# Patient Record
Sex: FEMALE | Race: WHITE | Employment: UNEMPLOYED | URBAN - METROPOLITAN AREA
[De-identification: names, ages, dates, MRNs, and addresses within clinical notes are randomized per-mention and may not be internally consistent; named-entity substitution may affect disease eponyms.]

---

## 2018-11-13 ENCOUNTER — HOSPITAL ENCOUNTER (OUTPATIENT)
Dept: GENERAL RADIOLOGY | Age: 43
Discharge: HOME | End: 2018-11-15
Payer: COMMERCIAL

## 2018-11-13 DIAGNOSIS — Z12.31 VISIT FOR SCREENING MAMMOGRAM: ICD-10-CM

## 2018-11-13 DIAGNOSIS — R92.2 BREAST DENSITY: ICD-10-CM

## 2018-11-13 PROCEDURE — 77063 BREAST TOMOSYNTHESIS BI: CPT

## 2018-11-13 PROCEDURE — 76641 ULTRASOUND BREAST COMPLETE: CPT

## 2019-04-15 ENCOUNTER — HOSPITAL ENCOUNTER (OUTPATIENT)
Dept: CT IMAGING | Age: 44
Discharge: HOME | End: 2019-04-17
Payer: COMMERCIAL

## 2019-04-15 DIAGNOSIS — S92.215D CLOSED NONDISPLACED FRACTURE OF CUBOID OF LEFT FOOT WITH ROUTINE HEALING, SUBSEQUENT ENCOUNTER: ICD-10-CM

## 2019-04-15 PROCEDURE — 73700 CT LOWER EXTREMITY W/O DYE: CPT

## 2019-07-26 ENCOUNTER — HOSPITAL ENCOUNTER (OUTPATIENT)
Dept: GENERAL RADIOLOGY | Age: 44
Discharge: HOME | End: 2019-07-28
Payer: COMMERCIAL

## 2019-07-26 DIAGNOSIS — N63.0 BREAST LUMP: ICD-10-CM

## 2019-07-26 DIAGNOSIS — N63.10 LUMP OF BREAST, RIGHT: ICD-10-CM

## 2019-07-26 PROCEDURE — 19083 BX BREAST 1ST LESION US IMAG: CPT

## 2019-07-26 PROCEDURE — G0279 TOMOSYNTHESIS, MAMMO: HCPCS

## 2019-07-26 PROCEDURE — 76642 ULTRASOUND BREAST LIMITED: CPT

## 2019-08-07 ENCOUNTER — TELEPHONE (OUTPATIENT)
Dept: BREAST CENTER | Age: 44
End: 2019-08-07

## 2019-08-19 ASSESSMENT — ENCOUNTER SYMPTOMS
CONSTIPATION: 0
BACK PAIN: 0
SORE THROAT: 0
ABDOMINAL PAIN: 0
ABDOMINAL DISTENTION: 0
RHINORRHEA: 0
EYE DISCHARGE: 0
NAUSEA: 0
SINUS PRESSURE: 0
SINUS PAIN: 0
EYE ITCHING: 0
VOICE CHANGE: 0
SHORTNESS OF BREATH: 0
WHEEZING: 0
CHOKING: 0
CHEST TIGHTNESS: 0
BLOOD IN STOOL: 0
DIARRHEA: 0
COUGH: 0
VOMITING: 0
TROUBLE SWALLOWING: 0

## 2019-08-19 NOTE — PROGRESS NOTES
Allergies   Allergen Reactions    Flagyl [Metronidazole]      Rapid heart rate    Seldane [Terfenadine]     Tetracyclines & Related     Terazol [Terconazole] Rash       Family History   Problem Relation Age of Onset    Thyroid Disease Mother         hypothyroidism    High Cholesterol Mother     High Blood Pressure Father     Cancer Maternal Aunt 44        Breast cancer       Social History     Socioeconomic History    Marital status:      Spouse name: Not on file    Number of children: Not on file    Years of education: Not on file    Highest education level: Not on file   Occupational History    Not on file   Social Needs    Financial resource strain: Not on file    Food insecurity:     Worry: Not on file     Inability: Not on file    Transportation needs:     Medical: Not on file     Non-medical: Not on file   Tobacco Use    Smoking status: Former Smoker     Last attempt to quit: 10/1/2004     Years since quittin.8    Smokeless tobacco: Never Used    Tobacco comment: 1/2 pack per day x 10 years   Substance and Sexual Activity    Alcohol use: Yes     Comment: 1 red wine monthly    Drug use: No    Sexual activity: Not on file   Lifestyle    Physical activity:     Days per week: Not on file     Minutes per session: Not on file    Stress: Not on file   Relationships    Social connections:     Talks on phone: Not on file     Gets together: Not on file     Attends Islam service: Not on file     Active member of club or organization: Not on file     Attends meetings of clubs or organizations: Not on file     Relationship status: Not on file    Intimate partner violence:     Fear of current or ex partner: Not on file     Emotionally abused: Not on file     Physically abused: Not on file     Forced sexual activity: Not on file   Other Topics Concern    Not on file   Social History Narrative    Not on file       OCCUPATION:  Not currently working. .     Review of Systems   Constitutional: Negative for activity change, appetite change, chills, fatigue, fever and unexpected weight change. She generally feels well. HENT: Negative for congestion, postnasal drip, rhinorrhea, sinus pressure, sinus pain, sore throat, trouble swallowing and voice change. Eyes: Negative for discharge, itching and visual disturbance. Respiratory: Negative for cough, choking, chest tightness, shortness of breath and wheezing. Cardiovascular: Negative for chest pain, palpitations and leg swelling. Gastrointestinal: Negative for abdominal distention, abdominal pain, blood in stool, constipation, diarrhea, nausea and vomiting. Endocrine: Negative for cold intolerance and heat intolerance. Genitourinary: Negative for difficulty urinating, dysuria, frequency and hematuria. Musculoskeletal: Negative for arthralgias, back pain, gait problem, joint swelling, myalgias, neck pain and neck stiffness. Allergic/Immunologic: Negative for environmental allergies and food allergies. Neurological: Negative for dizziness, seizures, syncope, speech difficulty, weakness, light-headedness and headaches. Hematological: Negative for adenopathy. Does not bruise/bleed easily. Psychiatric/Behavioral: Negative for agitation, confusion and decreased concentration. The patient is not nervous/anxious. Objective:   Physical Exam   Constitutional: She is oriented to person, place, and time. She appears well-developed and well-nourished. No distress. Pleasant and cooperative. Accompanied by her mother. HENT:   Head: Normocephalic and atraumatic. Mouth/Throat: Oropharynx is clear and moist. No oropharyngeal exudate. Eyes: Conjunctivae and EOM are normal. Right eye exhibits no discharge. Left eye exhibits no discharge. No scleral icterus. Neck: Normal range of motion. Neck supple. No JVD present. No tracheal deviation present. No thyromegaly present.    Cardiovascular: Normal rate and regular rhythm. Exam reveals no gallop and no friction rub. Murmur (Faint) heard. Pulmonary/Chest: Effort normal and breath sounds normal. No stridor. No respiratory distress. She has no wheezes. She has no rales. She exhibits no mass, no tenderness, no bony tenderness, no laceration, no edema, no deformity, no swelling and no retraction. Right breast exhibits no inverted nipple, no mass, no nipple discharge, no skin change and no tenderness. Left breast exhibits no inverted nipple, no mass, no nipple discharge, no skin change and no tenderness. Breasts are symmetrical.   Breasts are with age appropriate density bilaterally. No skin dimpling or puckering. No nipple discharge. No clinically suspicious lumps nodules or masses appreciated. No axillary lymphadenopathy. Abdominal: Soft. She exhibits no distension. There is no tenderness. There is no rebound and no guarding. Musculoskeletal: Normal range of motion. She exhibits no edema, tenderness or deformity. Right shoulder: Normal.        Left shoulder: Normal.   Lymphadenopathy:     She has no cervical adenopathy. Right cervical: No superficial cervical, no deep cervical and no posterior cervical adenopathy present. Left cervical: No superficial cervical, no deep cervical and no posterior cervical adenopathy present. Right axillary: No pectoral and no lateral adenopathy present. Left axillary: No pectoral and no lateral adenopathy present. Neurological: She is alert and oriented to person, place, and time. Coordination normal.   Skin: Skin is warm and dry. No rash noted. She is not diaphoretic. No erythema. No pallor. Psychiatric: She has a normal mood and affect. Her behavior is normal. Judgment and thought content normal.   Nursing note and vitals reviewed.       Assessment:     41 y.o. extremely pleasant female who's risk for breast cancer include gender,  Mother and Maternal Aunt (maternal aunt  age chemoprevention with Tamoxifen, which she is adamantly opposed to. She would like to think about the MRI and she will call us if she decides to proceed. Otherwise, will repeat screening mammogram in November +/- Right breast US (versus repeating in 6 months - January). Plan:   1. Continue monthly breast self examination; detailed instructions reviewed today. Bring any changes to your physician's attention. 2. Continue healthy diet and exercise routinely as tolerated to maintain IBW. 3. Avoid alcohol. 4. Limit caffeine intake. 5. Repeat mammogram November 2019.  6. Repeat right breast US in January 2020. 7. MRI bilateral breast to be done now (she will call us if she decides to proceed). 8. Continue follow up with Primary Care and Gynecology. 9. RTC November after mammogram with visit with Dr. Puente Record same day. During today's visit, face-to-face time 45 minutes, greater than 50% in counseling education and coordination of care. All questions were answered to her apparent satisfaction, and she is agreeable to the plan as outlined above. Jim Duenas RN, MSN, APRN-CNP, [address]  Advanced Oncology Certified Nurse Practitioner  Department of Breast Surgery  [facility] [place]/  Care in collaboration with Dr. Hiren Mistry.  Bogdan/Gloria Cristina 61, APRN - CNP

## 2019-08-26 ENCOUNTER — OFFICE VISIT (OUTPATIENT)
Dept: BREAST CENTER | Age: 44
End: 2019-08-26
Payer: COMMERCIAL

## 2019-08-26 VITALS
TEMPERATURE: 97.8 F | HEART RATE: 62 BPM | WEIGHT: 123.5 LBS | HEIGHT: 64 IN | SYSTOLIC BLOOD PRESSURE: 112 MMHG | OXYGEN SATURATION: 99 % | BODY MASS INDEX: 21.08 KG/M2 | DIASTOLIC BLOOD PRESSURE: 62 MMHG | RESPIRATION RATE: 16 BRPM

## 2019-08-26 DIAGNOSIS — Z12.39 BREAST CANCER SCREENING, HIGH RISK PATIENT: Primary | ICD-10-CM

## 2019-08-26 DIAGNOSIS — Z91.89 AT HIGH RISK FOR BREAST CANCER: ICD-10-CM

## 2019-08-26 DIAGNOSIS — R92.2 DENSE BREAST TISSUE ON MAMMOGRAM: ICD-10-CM

## 2019-08-26 DIAGNOSIS — Z80.3 FAMILY HISTORY OF BREAST CANCER IN FIRST DEGREE RELATIVE: ICD-10-CM

## 2019-08-26 DIAGNOSIS — R92.2 DENSE BREAST TISSUE: ICD-10-CM

## 2019-08-26 DIAGNOSIS — N63.10 BREAST MASS, RIGHT: ICD-10-CM

## 2019-08-26 PROCEDURE — 99203 OFFICE O/P NEW LOW 30 MIN: CPT | Performed by: NURSE PRACTITIONER

## 2019-08-26 PROCEDURE — 99204 OFFICE O/P NEW MOD 45 MIN: CPT | Performed by: NURSE PRACTITIONER

## 2019-08-26 NOTE — COMMUNICATION BODY
Assessment:  41 y.o. extremely pleasant female who's risk for breast cancer include gender,  Mother and Maternal Aunt (maternal aunt  age 39 due to breast cancer). Her mother had Genetic testing which was negative. IMAGING:  -Bilateral screening mammogram on 2018 @ [facility]:  Negative (BI-RADS 1); Type 3 density.  -Bilateral breast US 2018 @ [address] [place] [address] Dr Jaime:   Finding 1:   Sonography was performed in both breasts using a radial and anti-radial approach. There are areas of duct ectasia seen in the retro-areolar region of both breasts.       No sonographic evidence of suspicious solid or cystic mass lesions.  No focal areas of suspicious atypical echogenicity.       The examination included imaging and evaluation of all four quadrants, the retroareolar region, and axilla of both breasts.       IMPRESSION:   Areas of duct ectasia in both breasts are benign.       Screening mammogram in 1 year is recommended.       =======================================   BI-RADS Category 2:  Benign     A diagnostic right mammogram (for palpable lump) and right breast US on 2019 @ [address] [place] [address] Dr Jaime:  TISSUE DENSITY:   The breast is extremely dense (Type 4 density).       MAMMOGRAM FINDINGS:   Finding 1:   The finding in question is not seen on mammogram.       ULTRASOUND FINDINGS:           Finding 1:   Sonography was performed in the right breast using a radial and anti-radial approach. There is a new oval solid mass with partially defined margins measuring 8 x 4 x 10 mm seen in the right breast at 6 o'clock located 7 centimeters from the nipple. Internal echogenicity is mixed demonstrating both hyperechoic and hypoechoic areas.       IMPRESSION:   New solid mass in the right breast is suspicious.    An ultrasound guided biopsy is recommended.       =======================================   BI-RADS Category 4:  Suspicious Abnormality   =======================================     2019 Addendum:     ULTRASOUND

## 2019-08-26 NOTE — LETTER
[facility]  [address] [address]  Phone: [phone]  Fax: [phone]        August 26, 2019       Patient: Laila Rinaldi   MR Number: [id number]   YOB: 1975   Date of Visit: 8/26/2019       Dear Dr. Katie Solis: Thank you for the request for consultation for Dorie Webber to the office of Dr. Felicia Marshall and Nurse Practitioner Rafael Vasquez for the evaluation of her high risk for breast cancer and a right breast mass. Below are the relevant portions of my assessment and plan of care. If you have questions, please do not hesitate to call me. I look forward to following Pedro Francois along with you.     Sincerely,        ERIC Roach - CNP    CC providers: Lili Marte, DO

## 2019-09-16 ENCOUNTER — OFFICE VISIT (OUTPATIENT)
Dept: ORTHOPEDIC SURGERY | Age: 44
End: 2019-09-16
Payer: COMMERCIAL

## 2019-09-16 VITALS
HEIGHT: 64 IN | DIASTOLIC BLOOD PRESSURE: 60 MMHG | SYSTOLIC BLOOD PRESSURE: 124 MMHG | HEART RATE: 71 BPM | BODY MASS INDEX: 20.49 KG/M2 | WEIGHT: 120 LBS

## 2019-09-16 DIAGNOSIS — M25.561 ACUTE PAIN OF RIGHT KNEE: Primary | ICD-10-CM

## 2019-09-16 PROCEDURE — 99203 OFFICE O/P NEW LOW 30 MIN: CPT | Performed by: ORTHOPAEDIC SURGERY

## 2019-09-17 NOTE — PROGRESS NOTES
mouth.  , Disp: , Rfl:     ALLERGIES  Allergies   Allergen Reactions    Flagyl [Metronidazole]      Rapid heart rate    Tetracyclines & Related Diarrhea    Seldane [Terfenadine] Anxiety     \"Hallucinations\"    Terazol [Terconazole] Rash       Controlled Substances Monitoring:          REVIEW OF SYSTEMS:     Constitutional:  Negative for weight loss, fevers, chills, fatigue  Cardiovascular: Negative for chest pain, palpitations  Pulmonary: Negative for shortness of breath, labored breathing, cough  GI: negative for abdominal pain, nausea, vomitting   MSK: per HPI  Skin: negative for rash, open wounds    All other systems reviewed and are negative         PHYSICAL EXAM     Vitals:    09/16/19 1503   BP: 124/60   Pulse: 71   Weight: 120 lb (54.4 kg)   Height: 5' 4\" (1.626 m)       Height: 5' 4\" (1.626 m)  Weight: [unfilled]  BMI:  Body mass index is 20.6 kg/m². General: The patient is alert and oriented x 3, appears to be stated age and in no distress. HEENT: head is normocephalic, atraumatic. EOMI. Neck: supple, trachea midline, no thyromegaly   Cardiovascular: peripheral pulses palpable. Normal Capillary refill   Respiratory: breathing unlabored, chest expansion symmetric   Skin: no rash, no open wounds, no erythema  Psych: normal affect; mood stable  Neurologic: gait normal, sensation grossly intact in extremities  MSK:        Lower Extremity:   Ipsilateral hip exam shows normal range of motion without pain with impingement testing. On exam the right knee, there is no effusion. The patella tracks midline. Patellar translation is normal.  Range of motion is full. Lockman and posterior drawer are stable. Knee is stable to varus and valgus stress throughout range of motion. Kavita's is negative. There is no tenderness posterior along the hamstring           IMAGING:    XR: 4 views of the right knee including weightbearing views show no acute abnormality.   Joint spaces are

## 2019-10-18 ENCOUNTER — TELEPHONE (OUTPATIENT)
Dept: BREAST CENTER | Age: 44
End: 2019-10-18

## 2019-10-19 ENCOUNTER — OFFICE VISIT (OUTPATIENT)
Dept: FAMILY MEDICINE CLINIC | Age: 44
End: 2019-10-19
Payer: COMMERCIAL

## 2019-10-19 VITALS — TEMPERATURE: 98.6 F | OXYGEN SATURATION: 98 % | HEART RATE: 70 BPM | WEIGHT: 120 LBS | BODY MASS INDEX: 20.6 KG/M2

## 2019-10-19 DIAGNOSIS — J02.0 ACUTE STREPTOCOCCAL PHARYNGITIS: Primary | ICD-10-CM

## 2019-10-19 PROCEDURE — 99213 OFFICE O/P EST LOW 20 MIN: CPT | Performed by: FAMILY MEDICINE

## 2019-10-19 RX ORDER — METHYLPREDNISOLONE 4 MG/1
TABLET ORAL
Qty: 1 KIT | Refills: 0 | Status: SHIPPED
Start: 2019-10-19 | End: 2020-03-10

## 2019-10-19 RX ORDER — LIDOCAINE HYDROCHLORIDE 20 MG/ML
10 SOLUTION OROPHARYNGEAL
Qty: 100 ML | Refills: 0 | Status: SHIPPED
Start: 2019-10-19 | End: 2020-03-10

## 2019-10-19 RX ORDER — AMOXICILLIN 875 MG/1
875 TABLET, COATED ORAL 2 TIMES DAILY
Qty: 14 TABLET | Refills: 0 | Status: SHIPPED | OUTPATIENT
Start: 2019-10-19 | End: 2019-10-26

## 2019-10-19 ASSESSMENT — ENCOUNTER SYMPTOMS
SWOLLEN GLANDS: 1
TROUBLE SWALLOWING: 0
GASTROINTESTINAL NEGATIVE: 1
EYES NEGATIVE: 1
RESPIRATORY NEGATIVE: 1
SORE THROAT: 1

## 2019-11-06 DIAGNOSIS — N63.10 BREAST MASS, RIGHT: Primary | ICD-10-CM

## 2019-11-06 DIAGNOSIS — Z91.89 AT HIGH RISK FOR BREAST CANCER: ICD-10-CM

## 2019-11-15 ASSESSMENT — ENCOUNTER SYMPTOMS
SINUS PAIN: 0
NAUSEA: 0
COUGH: 0
RHINORRHEA: 0
TROUBLE SWALLOWING: 0
SHORTNESS OF BREATH: 0
VOMITING: 0
SORE THROAT: 0
EYE DISCHARGE: 0
CHOKING: 0
CONSTIPATION: 0
BACK PAIN: 0
CHEST TIGHTNESS: 0
VOICE CHANGE: 0
ABDOMINAL PAIN: 0
WHEEZING: 0
BLOOD IN STOOL: 0
ABDOMINAL DISTENTION: 0
EYE ITCHING: 0
DIARRHEA: 0
SINUS PRESSURE: 0

## 2019-11-20 ENCOUNTER — TELEPHONE (OUTPATIENT)
Dept: BREAST CENTER | Age: 44
End: 2019-11-20

## 2019-11-22 ENCOUNTER — HOSPITAL ENCOUNTER (OUTPATIENT)
Dept: GENERAL RADIOLOGY | Age: 44
Discharge: HOME | End: 2019-11-24
Payer: COMMERCIAL

## 2019-11-22 ENCOUNTER — TELEPHONE (OUTPATIENT)
Dept: BREAST CENTER | Age: 44
End: 2019-11-22

## 2019-11-22 DIAGNOSIS — Z80.3 FAMILY HISTORY OF BREAST CANCER IN FIRST DEGREE RELATIVE: ICD-10-CM

## 2019-11-22 DIAGNOSIS — Z91.89 AT HIGH RISK FOR BREAST CANCER: ICD-10-CM

## 2019-11-22 DIAGNOSIS — R92.2 DENSE BREAST TISSUE ON MAMMOGRAM: ICD-10-CM

## 2019-11-22 DIAGNOSIS — Z12.39 BREAST CANCER SCREENING, HIGH RISK PATIENT: ICD-10-CM

## 2019-11-22 DIAGNOSIS — R92.2 DENSE BREAST TISSUE: ICD-10-CM

## 2019-11-22 DIAGNOSIS — N63.10 BREAST MASS, RIGHT: ICD-10-CM

## 2019-11-22 PROCEDURE — G0279 TOMOSYNTHESIS, MAMMO: HCPCS

## 2019-11-22 PROCEDURE — 76642 ULTRASOUND BREAST LIMITED: CPT

## 2019-12-09 ENCOUNTER — OFFICE VISIT (OUTPATIENT)
Dept: BREAST CENTER | Age: 44
End: 2019-12-09
Payer: COMMERCIAL

## 2019-12-09 VITALS
OXYGEN SATURATION: 98 % | TEMPERATURE: 98.6 F | DIASTOLIC BLOOD PRESSURE: 62 MMHG | SYSTOLIC BLOOD PRESSURE: 98 MMHG | BODY MASS INDEX: 20.83 KG/M2 | RESPIRATION RATE: 16 BRPM | HEIGHT: 64 IN | WEIGHT: 122 LBS | HEART RATE: 58 BPM

## 2019-12-09 DIAGNOSIS — N63.0 BREAST NODULE: ICD-10-CM

## 2019-12-09 DIAGNOSIS — Z91.89 AT HIGH RISK FOR BREAST CANCER: Primary | ICD-10-CM

## 2019-12-09 DIAGNOSIS — Z91.89 AT HIGH RISK FOR BREAST CANCER: ICD-10-CM

## 2019-12-09 DIAGNOSIS — N63.10 BREAST MASS, RIGHT: Primary | ICD-10-CM

## 2019-12-09 PROCEDURE — 99213 OFFICE O/P EST LOW 20 MIN: CPT | Performed by: SURGERY

## 2019-12-09 PROCEDURE — 99214 OFFICE O/P EST MOD 30 MIN: CPT | Performed by: SURGERY

## 2020-01-02 ENCOUNTER — TELEPHONE (OUTPATIENT)
Dept: BREAST CENTER | Age: 45
End: 2020-01-02

## 2020-01-07 ENCOUNTER — TELEPHONE (OUTPATIENT)
Dept: BREAST CENTER | Age: 45
End: 2020-01-07

## 2020-01-07 NOTE — TELEPHONE ENCOUNTER
Left VM re:  MBI was denied by insurance. If she remains uninterested in MRI bilateral breasts, will repeat US (complete) 6 months from date of her last visit. Doug Elena, RN, MSN, APRN-CNP, [address]  Advanced Oncology Certified Nurse Practitioner  Department of Breast Surgery  [facility]/  [facility] in collaboration with Dr. Yousif Mishra.  Bogdan/Dr. Marlo Fleischer

## 2020-01-09 ENCOUNTER — TELEPHONE (OUTPATIENT)
Dept: BREAST CENTER | Age: 45
End: 2020-01-09

## 2020-03-10 ENCOUNTER — OFFICE VISIT (OUTPATIENT)
Dept: PRIMARY CARE CLINIC | Age: 45
End: 2020-03-10
Payer: COMMERCIAL

## 2020-03-10 VITALS
DIASTOLIC BLOOD PRESSURE: 80 MMHG | RESPIRATION RATE: 16 BRPM | SYSTOLIC BLOOD PRESSURE: 102 MMHG | BODY MASS INDEX: 20.66 KG/M2 | TEMPERATURE: 98.6 F | HEART RATE: 64 BPM | WEIGHT: 121 LBS | OXYGEN SATURATION: 98 % | HEIGHT: 64 IN

## 2020-03-10 PROCEDURE — 99213 OFFICE O/P EST LOW 20 MIN: CPT | Performed by: FAMILY MEDICINE

## 2020-03-10 RX ORDER — FLUCONAZOLE 150 MG/1
150 TABLET ORAL ONCE
Qty: 1 TABLET | Refills: 0 | Status: SHIPPED | OUTPATIENT
Start: 2020-03-10 | End: 2020-03-10

## 2020-03-10 RX ORDER — AMOXICILLIN 875 MG/1
875 TABLET, COATED ORAL 2 TIMES DAILY
Qty: 20 TABLET | Refills: 0 | Status: SHIPPED | OUTPATIENT
Start: 2020-03-10 | End: 2020-03-20

## 2020-03-10 ASSESSMENT — ENCOUNTER SYMPTOMS
COUGH: 1
FACIAL SWELLING: 0
TROUBLE SWALLOWING: 0
SINUS PRESSURE: 1
EYES NEGATIVE: 1

## 2020-03-10 ASSESSMENT — PATIENT HEALTH QUESTIONNAIRE - PHQ9
SUM OF ALL RESPONSES TO PHQ QUESTIONS 1-9: 0
2. FEELING DOWN, DEPRESSED OR HOPELESS: 0
SUM OF ALL RESPONSES TO PHQ9 QUESTIONS 1 & 2: 0
1. LITTLE INTEREST OR PLEASURE IN DOING THINGS: 0
SUM OF ALL RESPONSES TO PHQ QUESTIONS 1-9: 0

## 2020-03-10 NOTE — PROGRESS NOTES
Chief Complaint:   Chief Complaint   Patient presents with    Otalgia     left    URI     sore throat, sinus        Sinusitis   This is a new problem. The current episode started in the past 7 days. The problem has been gradually worsening since onset. There has been no fever. Associated symptoms include congestion, coughing and sinus pressure. Patient Active Problem List   Diagnosis    Chronic headaches    Migraine       Past Medical History:   Diagnosis Date    Fibrocystic breast     Dr. Bryce PIERCE([phone])     saw Dr. Augie De Souza and several ENTs--now sees chiropractor and accupuncturist    Kidney stone     x2    Murmur        Past Surgical History:   Procedure Laterality Date    BREAST SURGERY  12/2011    Benign--needle bx       Current Outpatient Medications   Medication Sig Dispense Refill    fluconazole (DIFLUCAN) 150 MG tablet Take 1 tablet by mouth once for 1 dose 1 tablet 0    amoxicillin (AMOXIL) 875 MG tablet Take 1 tablet by mouth 2 times daily for 10 days 20 tablet 0     No current facility-administered medications for this visit. Allergies   Allergen Reactions    Flagyl [Metronidazole]      Rapid heart rate    Tetracyclines & Related Diarrhea    Seldane [Terfenadine] Anxiety     \"Hallucinations\"    Terazol [Terconazole] Rash       Social History     Socioeconomic History    Marital status:      Spouse name: None    Number of children: None    Years of education: None    Highest education level: None   Occupational History    None   Social Needs    Financial resource strain: None    Food insecurity     Worry: None     Inability: None    Transportation needs     Medical: None     Non-medical: None   Tobacco Use    Smoking status: Former Smoker     Last attempt to quit: 10/1/2004     Years since quitting: 15.4    Smokeless tobacco: Never Used    Tobacco comment: 1/2 pack per day x 10 years   Substance and Sexual Activity    Alcohol use:  Yes stridor. No wheezing or rales. Chest:      Chest wall: No tenderness. Abdominal:      General: Bowel sounds are normal. There is no distension. Palpations: Abdomen is soft. There is no mass. Tenderness: There is no abdominal tenderness. There is no guarding or rebound. Musculoskeletal: Normal range of motion. Skin:     General: Skin is warm and dry. Coloration: Skin is not pale. Findings: No erythema or rash. Neurological:      Mental Status: She is alert and oriented to person, place, and time. Deep Tendon Reflexes: Reflexes are normal and symmetric. Psychiatric:         Judgment: Judgment normal.                               ASSESSMENT/PLAN:    Hugh Paniagua was seen today for otalgia and uri. Diagnoses and all orders for this visit:    Acute bacterial sinusitis  -     fluconazole (DIFLUCAN) 150 MG tablet; Take 1 tablet by mouth once for 1 dose  -     amoxicillin (AMOXIL) 875 MG tablet;  Take 1 tablet by mouth 2 times daily for 10 days            Nery Graves DO    3/10/2020  4:43 PM

## 2020-04-24 ASSESSMENT — ENCOUNTER SYMPTOMS
CHEST TIGHTNESS: 0
EYE ITCHING: 0
TROUBLE SWALLOWING: 0
BACK PAIN: 0
VOICE CHANGE: 0
SINUS PAIN: 0
SINUS PRESSURE: 0
SORE THROAT: 0
BLOOD IN STOOL: 0
CONSTIPATION: 0
DIARRHEA: 0
ABDOMINAL DISTENTION: 0
WHEEZING: 0
COUGH: 0
SHORTNESS OF BREATH: 0
RHINORRHEA: 0
ABDOMINAL PAIN: 0
CHOKING: 0
NAUSEA: 0
VOMITING: 0
EYE DISCHARGE: 0

## 2020-04-24 NOTE — PROGRESS NOTES
oval well circumscribed solid mass measuring 14 x 6 x 9 mm seen in the right breast at 6 o'clock. Internal echogenicity is isoechoic.       There is no axillary lymphadenopathy.       No sonographic evidence of suspicious solid or cystic mass lesions.  No focal areas of suspicious atypical echogenicity.       The examination included imaging and evaluation of all four quadrants, the retroareolar region, and axilla of both breasts.       IMPRESSION:   Stable solid mass in the right breast is benign.       Screening mammogram in 6 months is recommended     All provided imaging reviewed with patient. Most recent imagin19  MAMMOGRAM VIEWS:   The following mammographic views where obtained:       ULTRASOUND TECHNIQUE:   High-resolution real-time ultrasound scanning was performed. Additional elastography and doppler color flow analysis of any finding was also obtained.       TOMOSYNTHESIS:   Although available and offered, the patient chose not to obtain Tomosynthesis (3 Dimensional Breast Imaging).       COMPARISON:   The present examination has been compared to prior imaging studies performed at [address] [place] [address] on 2018 and 2019.       CAD:   This exam was reviewed using the [employer] Computer Aided Detection (CAD)       TISSUE DENSITY:   The breasts are extremely dense (Type 4 density).       MAMMOGRAM FINDINGS:   In the left breast, no suspicious masses, areas of suspicious architectural distortion, suspicious calcifications, or additional suspicious findings are identified.           Finding 1:   The finding in question is not seen on mammogram.       ULTRASOUND FINDINGS:           Finding 1:   Sonography was performed in the right breast using a radial and anti-radial approach. There is an oval well circumscribed solid mass measuring 10 x 5 x 9 mm seen in the right breast at 6 o'clock located 7 centimeters from the nipple.  Internal    echogenicity is hypoechoic.     Substance and Sexual Activity    Alcohol use: Yes     Comment: red wine twice a month    Drug use: No    Sexual activity: Not on file   Lifestyle    Physical activity     Days per week: Not on file     Minutes per session: Not on file    Stress: Not on file   Relationships    Social connections     Talks on phone: Not on file     Gets together: Not on file     Attends [employer] service: Not on file     Active member of club or organization: Not on file     Attends meetings of clubs or organizations: Not on file     Relationship status: Not on file    Intimate partner violence     Fear of current or ex partner: Not on file     Emotionally abused: Not on file     Physically abused: Not on file     Forced sexual activity: Not on file   Other Topics Concern    Not on file   Social History Narrative    Not on file       OCCUPATION:  Not currently working. . Review of Systems   Constitutional: Negative for activity change, appetite change, chills, fatigue, fever and unexpected weight change. She generally feels well. HENT: Negative for congestion, postnasal drip, rhinorrhea, sinus pressure, sinus pain, sore throat, trouble swallowing and voice change. Eyes: Negative for discharge, itching and visual disturbance. Respiratory: Negative for cough, choking, chest tightness, shortness of breath and wheezing. Cardiovascular: Negative for chest pain, palpitations and leg swelling. Gastrointestinal: Negative for abdominal distention, abdominal pain, blood in stool, constipation, diarrhea, nausea and vomiting. Endocrine: Negative for cold intolerance and heat intolerance. Genitourinary: Negative for difficulty urinating, dysuria, frequency and hematuria. Musculoskeletal: Negative for arthralgias, back pain, gait problem, joint swelling, myalgias, neck pain and neck stiffness. Allergic/Immunologic: Negative for environmental allergies and food allergies.    Neurological: Negative

## 2020-05-18 ENCOUNTER — HOSPITAL ENCOUNTER (OUTPATIENT)
Dept: GENERAL RADIOLOGY | Age: 45
Discharge: HOME | End: 2020-05-20
Payer: COMMERCIAL

## 2020-05-18 ENCOUNTER — OFFICE VISIT (OUTPATIENT)
Dept: BREAST CENTER | Age: 45
End: 2020-05-18
Payer: COMMERCIAL

## 2020-05-18 VITALS
HEART RATE: 60 BPM | TEMPERATURE: 98.8 F | RESPIRATION RATE: 18 BRPM | SYSTOLIC BLOOD PRESSURE: 112 MMHG | DIASTOLIC BLOOD PRESSURE: 62 MMHG | BODY MASS INDEX: 21.51 KG/M2 | WEIGHT: 126 LBS | OXYGEN SATURATION: 99 % | HEIGHT: 64 IN

## 2020-05-18 PROCEDURE — 99213 OFFICE O/P EST LOW 20 MIN: CPT | Performed by: SURGERY

## 2020-05-18 PROCEDURE — 76641 ULTRASOUND BREAST COMPLETE: CPT

## 2020-06-25 ENCOUNTER — OFFICE VISIT (OUTPATIENT)
Dept: ENT CLINIC | Age: 45
End: 2020-06-25
Payer: COMMERCIAL

## 2020-06-25 VITALS — WEIGHT: 123 LBS | BODY MASS INDEX: 21 KG/M2 | HEIGHT: 64 IN

## 2020-06-25 PROCEDURE — 99204 OFFICE O/P NEW MOD 45 MIN: CPT | Performed by: OTOLARYNGOLOGY

## 2020-06-25 RX ORDER — FLUTICASONE PROPIONATE 50 MCG
1 SPRAY, SUSPENSION (ML) NASAL DAILY
COMMUNITY

## 2020-06-25 NOTE — LETTER
Dear Dr David Raman, DO     We had the pleasure of seeing Nelida Ponce, 1975 here    on 6/25/2020  Please see below for review of care and plans. Chief complaint-     ICD-10-CM    1. Palate mass R22.0    2. 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 Allergic rhinitis, unspecified seasonality, unspecified trigger J30.9          History of Present Illness- pt with 3 mo hx of worsening sensation in mouth with uri allergy sxs that induces cough and also a recurring sloughing of upper palate skin that is a nuisance. No bleeding. Nothing helps it. + exercises. Decent water intake. + caffeine. Review of Systems- No drainage, discharge, or headache. Complete 10 system ROS completed and negative except as noted above. Physical Examination-   Vital Signs-Ht 5' 4\" (1.626 m)   Wt 123 lb (55.8 kg)   BMI 21.11 kg/m²     Ears- Tympanic membranes clear bilaterally. No middle ear effusion. No pre or post auricular tenderness. Nose- Nasal mucosa clear and very dry. mild significant septal deviation or inferior turbinate hypertrophy. Oral Cavity/Oropharynx- Floor of mouth and tongue are soft and nontender. + 2mm lesion on left base of uvula, virile or polyp like. Area of white hyperkeratosis of the midline upper palate, ~3-4 mm oval. No posterior pharyngeal erythema. + gag reflex  Neck- Soft and nontender. No masses, lesions, lymphadenopathy, or thyroid nodules appreciated. Cranial Nerve- Cranial nerves II to XII intact. Extraocular muscles intact. No gross motor visual deficits. No spontaneous nystagmus. Face- No facial skin tenderness to palpation. Heart- No cyanosis, regular  Lungs- No stridor, no intercostal accessory muscle use  General- The patient is in no acute distress. A&O x3    Medical Decision Making and Treatment Plan. 1. Increase hydration  2. Nasal irrigation  3. Ketotifen eye drops  4. Steroid nasal spray  5. dw pt option of tx of masses.   May go away on own or can remove surgically. Not worried about cancer at this point. If they persist with no improvement or worsen, pt will fu and further discuss surgery as pt would like to wait for now. Reasonable and pt understood to call if changes. Thank you for the opportunity to take part in the care of this very pleasant patient, Marilin Edwards  Sincerely,            Ethan Cortes.  Chika Salvador M.D., Ph.D., Melody Amezcua  Department of Otolaryngology-Head and Neck Surgery

## 2020-06-25 NOTE — PROGRESS NOTES
surgically. Not worried about cancer at this point. If they persist with no improvement or worsen, pt will fu and further discuss surgery as pt would like to wait for now. Reasonable and pt understood to call if changes. Thank you for the opportunity to take part in the care of this very pleasant patient, Maya Tuttle  Sincerely,            Daria Kilpatrick.  Rayo Esquivel M.D., Ph.D., [place] [address]  Department of Otolaryngology-Head and Neck Surgery

## 2020-11-06 ASSESSMENT — ENCOUNTER SYMPTOMS
EYE ITCHING: 0
CHOKING: 0
WHEEZING: 0
ABDOMINAL PAIN: 0
BACK PAIN: 0
ABDOMINAL DISTENTION: 0
SINUS PAIN: 0
TROUBLE SWALLOWING: 0
RHINORRHEA: 0
COUGH: 0
CHEST TIGHTNESS: 0
EYE DISCHARGE: 0
CONSTIPATION: 0
SHORTNESS OF BREATH: 0
BLOOD IN STOOL: 0
VOICE CHANGE: 0
NAUSEA: 0
SINUS PRESSURE: 0
VOMITING: 0
SORE THROAT: 0
DIARRHEA: 0

## 2020-11-06 NOTE — PROGRESS NOTES
Subjective:  Tami Jose Alberto 30.4% Lifetime risk. Some elements of all sections below were copied from my previous note 11/15/19 and have been re-examined and updated where appropriate. All elements reflect the medical decision making of today May 18, 2020. Patient ID: Tessa Goldberg is a 39 y.o. female. [facility]  Office of Dr. Candice Reyes, Dr. Yulia Calles  Nurse Practitioner Neel Stein, APRN-[id number]  O: [phone]/LEWIS: [phone]    Tessa Goldberg presents for 6 month follow up after breast cancer risk assessment. PCP: Marci Link DO,  [address]. Patient does routinely do self breast exams. Patient denies nipple discharge. Patient admits to previous breast biopsy. Patient denies a personal history of breast cancer. Breast cancer risk factors include family hx on mother's side. She is concerned about some nodularity she has noted in the inferior aspect of her right breast.    Age of menarche was 17. Patient denies hormonal therapy. Patient is . Age of first live birth was 36. Patient did breast feed. Estimated body mass index is 21.11 kg/m² as calculated from the following:    Height as of 20: 5' 4\" (1.626 m). Weight as of 20: 123 lb (55.8 kg). Bra Size: 32A    Patient drinks significant caffeinated beverages. She does not smoke cigarettes. Because violence is so common, we ask all our patients: are you in a relationship or do you live with a person who threatens, hurts, or controls you:  Denies. 20: BILATERAL ULTRASOUND [facility]      ULTRASOUND FINDINGS:       In the left breast, there is no sonographic evidence of suspicious solid or cystic mass lesions.  No focal areas of suspicious atypical echogenicity.         Finding 1:   Sonography was performed in both breasts using a radial and anti-radial approach.  There is a stable oval well Solid mass in the right breast is probably benign. Follow-up mammogram and ultrasound in 1 year is recommended.         Patient has an elevated Tyrer Sherita of 30.5%, so imaging every 6 months is indicated. Patient has deferred undergoing an MRI due to gadolinium. We are discussing MBI alternating with mammogram and ultrasound. US today. Past Medical History:   Diagnosis Date    Fibrocystic breast     Dr. Jelani Garcia [id number])     saw Dr. Efrain Huber and several ENTs--now sees chiropractor and accupuncturist    Kidney stone     x2    Murmur        Past Surgical History:   Procedure Laterality Date    BREAST SURGERY  12/2011    Benign--needle bx     Current Outpatient Medications on File Prior to Visit   Medication Sig Dispense Refill    fluticasone (FLONASE) 50 MCG/ACT nasal spray 1 spray by Each Nostril route daily       No current facility-administered medications on file prior to visit.           Allergies   Allergen Reactions    Flagyl [Metronidazole]      Rapid heart rate    Tetracyclines & Related Diarrhea    Seldane [Terfenadine] Anxiety     \"Hallucinations\"    Terazol [Terconazole] Rash       Family History   Problem Relation Age of Onset    Thyroid Disease Mother         hypothyroidism    High Cholesterol Mother     Cancer Mother 61        breast    High Blood Pressure Father     Cancer Maternal Aunt 44        Breast cancer       Social History     Socioeconomic History    Marital status:      Spouse name: Not on file    Number of children: Not on file    Years of education: Not on file    Highest education level: Not on file   Occupational History    Not on file   Social Needs    Financial resource strain: Not on file    Food insecurity     Worry: Not on file     Inability: Not on file    Transportation needs     Medical: Not on file     Non-medical: Not on file   Tobacco Use    Smoking status: Former Smoker     Last attempt to quit: 10/1/2004     Years since quittin.1    Smokeless tobacco: Never Used    Tobacco comment: 1/2 pack per day x 10 years   Substance and Sexual Activity    Alcohol use: Yes     Comment: red wine twice a month    Drug use: No    Sexual activity: Not on file   Lifestyle    Physical activity     Days per week: Not on file     Minutes per session: Not on file    Stress: Not on file   Relationships    Social connections     Talks on phone: Not on file     Gets together: Not on file     Attends [employer]ism service: Not on file     Active member of club or organization: Not on file     Attends meetings of clubs or organizations: Not on file     Relationship status: Not on file    Intimate partner violence     Fear of current or ex partner: Not on file     Emotionally abused: Not on file     Physically abused: Not on file     Forced sexual activity: Not on file   Other Topics Concern    Not on file   Social History Narrative    Not on file       OCCUPATION:  Not currently working. . Review of Systems   Constitutional: Negative for activity change, appetite change, chills, fatigue, fever and unexpected weight change. She generally feels well. HENT: Negative for congestion, postnasal drip, rhinorrhea, sinus pressure, sinus pain, sore throat, trouble swallowing and voice change. Eyes: Negative for discharge, itching and visual disturbance. Respiratory: Negative for cough, choking, chest tightness, shortness of breath and wheezing. Cardiovascular: Negative for chest pain, palpitations and leg swelling. Gastrointestinal: Negative for abdominal distention, abdominal pain, blood in stool, constipation, diarrhea, nausea and vomiting. Endocrine: Negative for cold intolerance and heat intolerance. Genitourinary: Negative for difficulty urinating, dysuria, frequency and hematuria. Musculoskeletal: Negative for arthralgias, back pain, gait problem, joint swelling, myalgias, neck pain and neck stiffness. Allergic/Immunologic: Negative for environmental allergies and food allergies. Neurological: Negative for dizziness, seizures, syncope, speech difficulty, weakness, light-headedness and headaches. Hematological: Negative for adenopathy. Does not bruise/bleed easily. Psychiatric/Behavioral: Negative for agitation, confusion and decreased concentration. The patient is not nervous/anxious. Review of systems as noted above completely reviewed with patient. No changes. Objective:   Physical Exam  Vitals signs and nursing note reviewed. Constitutional:       General: She is not in acute distress. Appearance: She is well-developed. She is not diaphoretic. Comments: Pleasant and cooperative. Accompanied by her mother. HENT:      Head: Normocephalic and atraumatic. Mouth/Throat:      Pharynx: No oropharyngeal exudate. Eyes:      General: No scleral icterus. Right eye: No discharge. Left eye: No discharge. Conjunctiva/sclera: Conjunctivae normal.   Neck:      Musculoskeletal: Normal range of motion and neck supple. Thyroid: No thyromegaly. Vascular: No JVD. Trachea: No tracheal deviation. Cardiovascular:      Rate and Rhythm: Normal rate and regular rhythm. Heart sounds: Murmur (Faint) present. No friction rub. No gallop. Pulmonary:      Effort: Pulmonary effort is normal. No respiratory distress or retractions. Breath sounds: Normal breath sounds. No stridor. No wheezing or rales. Chest:      Chest wall: No mass, lacerations, deformity, swelling, tenderness or edema. Breasts: Breasts are symmetrical.         Right: No inverted nipple, mass, nipple discharge, skin change or tenderness. Left: No inverted nipple, mass, nipple discharge, skin change or tenderness. Comments: Findings on clinical exam correlate with imaging. Age-appropriate breast nodularity and density.   No skin changes and no axillary adenopathy. Abdominal:      General: There is no distension. Palpations: Abdomen is soft. Tenderness: There is no abdominal tenderness. There is no guarding or rebound. Musculoskeletal: Normal range of motion. General: No tenderness or deformity. Right shoulder: Normal.      Left shoulder: Normal.   Lymphadenopathy:      Cervical: No cervical adenopathy. Right cervical: No superficial, deep or posterior cervical adenopathy. Left cervical: No superficial, deep or posterior cervical adenopathy. Upper Body:      Right upper body: No pectoral adenopathy. Left upper body: No pectoral adenopathy. Skin:     General: Skin is warm and dry. Coloration: Skin is not pale. Findings: No erythema or rash. Neurological:      Mental Status: She is alert and oriented to person, place, and time. Coordination: Coordination normal.   Psychiatric:         Behavior: Behavior normal.         Thought Content: Thought content normal.         Judgment: Judgment normal.         Assessment:     39 y.o. pleasant female with increased risk for development of breast cancer who presents today for 6 month follow up. Her risks for breast cancer include gender,  Mother and Maternal Aunt (maternal aunt  age 39 due to breast cancer). Her mother had Genetic testing which was negative. She has opted for alternating breast screening ultrasound and screening mammogram, every 6 months. 20: [facility]    ULTRASOUND FINDINGS:       In the left breast, there is no sonographic evidence of suspicious solid or cystic mass lesions.  No focal areas of suspicious atypical echogenicity.         Finding 1:   Sonography was performed in both breasts using a radial and anti-radial approach. There is a stable oval well circumscribed solid mass measuring 14 x 6 x 9 mm seen in the right breast at 6 o'clock.  Internal echogenicity is isoechoic.       There is no axillary lymphadenopathy.       No sonographic evidence of suspicious solid or cystic mass lesions.  No focal areas of suspicious atypical echogenicity.       The examination included imaging and evaluation of all four quadrants, the retroareolar region, and axilla of both breasts.       IMPRESSION:   Stable solid mass in the right breast is benign.       Screening mammogram in 6 months is recommended       -TC:  During this visit, Tyrer-Cuzick Risk Evaluation was reviewed. Pt has a 30.4 % lifetime risk (to age 80) of developing breast cancer.      -Chemoprevention: We also discussed she would be a candidate for chemoprevention with Tamoxifen, which she is adamantly opposed to.      -B/L screening mammogram today, Negative; BI-RADS 1    Clinically,the nodularity on examination is stable and correlates with imaging findings. She does complain of some discomfort of the right breast from the solid, benign mass in the 6:00 position. She would like to discuss excision for symptomatic relief. Very dense breasts and strong family history with elevated Tyrer Jefferson score. Plan on follow-up in 6 months with B/L breast US and office visit at that time. Questions answered to patient satisfaction. Plan:   1. Continue monthly breast self examination; detailed instructions reviewed today. Bring any changes to your physician's attention. 2. Continue healthy diet and exercise routinely as tolerated to maintain IBW. 3. Avoid alcohol. 4. Limit caffeine intake. 5. Repeat mammogram November 2021.  6. Repeat bilateral breast US in May 2021.  7. Continue follow up with Primary Care and Gynecology. 8. RTC May with B/L breast US prior and to discuss possible excision of the right breast mass for symptom relief. Sooner for new or worsening symptoms. During today's visit, face-to-face time 15 minutes, greater than 50% in counseling education and coordination of care.  All questions were answered to her apparent satisfaction, and she is agreeable to the plan as outlined above. Pearl Jacobsen, RN, MSN, APRN-CNP, [address]  Advanced Oncology Certified Nurse Practitioner  Department of Breast Surgery  [facility] Breast [facility]/  [facility] in collaboration with Dr. Taiwo Castle.  Bogdan/Dr. Jason Fonseca APRN-CNP

## 2020-12-08 ENCOUNTER — HOSPITAL ENCOUNTER (OUTPATIENT)
Dept: GENERAL RADIOLOGY | Age: 45
Discharge: HOME | End: 2020-12-10
Payer: COMMERCIAL

## 2020-12-08 ENCOUNTER — OFFICE VISIT (OUTPATIENT)
Dept: BREAST CENTER | Age: 45
End: 2020-12-08
Payer: COMMERCIAL

## 2020-12-08 VITALS
TEMPERATURE: 98.1 F | BODY MASS INDEX: 20.32 KG/M2 | HEIGHT: 64 IN | RESPIRATION RATE: 18 BRPM | SYSTOLIC BLOOD PRESSURE: 104 MMHG | WEIGHT: 119 LBS | HEART RATE: 56 BPM | OXYGEN SATURATION: 99 % | DIASTOLIC BLOOD PRESSURE: 60 MMHG

## 2020-12-08 PROCEDURE — 99213 OFFICE O/P EST LOW 20 MIN: CPT | Performed by: NURSE PRACTITIONER

## 2020-12-08 PROCEDURE — G0279 TOMOSYNTHESIS, MAMMO: HCPCS

## 2021-02-03 ENCOUNTER — IMMUNIZATION (OUTPATIENT)
Dept: PRIMARY CARE CLINIC | Age: 46
End: 2021-02-03
Payer: COMMERCIAL

## 2021-02-03 PROCEDURE — 91300 COVID-19, PFIZER VACCINE 30MCG/0.3ML DOSE: CPT | Performed by: NURSE PRACTITIONER

## 2021-02-03 PROCEDURE — 0001A COVID-19, PFIZER VACCINE 30MCG/0.3ML DOSE: CPT | Performed by: NURSE PRACTITIONER

## 2021-02-26 ENCOUNTER — IMMUNIZATION (OUTPATIENT)
Dept: PRIMARY CARE CLINIC | Age: 46
End: 2021-02-26
Payer: COMMERCIAL

## 2021-02-26 PROCEDURE — 91300 COVID-19, PFIZER VACCINE 30MCG/0.3ML DOSE: CPT | Performed by: CLINICAL NURSE SPECIALIST

## 2021-02-26 PROCEDURE — 0002A COVID-19, PFIZER VACCINE 30MCG/0.3ML DOSE: CPT | Performed by: CLINICAL NURSE SPECIALIST

## 2021-03-02 ENCOUNTER — OFFICE VISIT (OUTPATIENT)
Dept: ENT CLINIC | Age: 46
End: 2021-03-02
Payer: COMMERCIAL

## 2021-03-02 VITALS — RESPIRATION RATE: 20 BRPM | BODY MASS INDEX: 21.85 KG/M2 | WEIGHT: 128 LBS | HEIGHT: 64 IN

## 2021-03-02 DIAGNOSIS — J34.9 SINUS DISEASE: ICD-10-CM

## 2021-03-02 DIAGNOSIS — J30.89 ALLERGIC RHINITIS DUE TO OTHER ALLERGIC TRIGGER, UNSPECIFIED SEASONALITY: ICD-10-CM

## 2021-03-02 DIAGNOSIS — H04.551 LACRIMAL DUCT STENOSIS, RIGHT: ICD-10-CM

## 2021-03-02 DIAGNOSIS — H69.80 DYSFUNCTION OF EUSTACHIAN TUBE, UNSPECIFIED LATERALITY: ICD-10-CM

## 2021-03-02 DIAGNOSIS — J34.2 DNS (DEVIATED NASAL SEPTUM): ICD-10-CM

## 2021-03-02 PROCEDURE — 31231 NASAL ENDOSCOPY DX: CPT | Performed by: OTOLARYNGOLOGY

## 2021-03-02 PROCEDURE — 99214 OFFICE O/P EST MOD 30 MIN: CPT | Performed by: OTOLARYNGOLOGY

## 2021-03-02 NOTE — PROGRESS NOTES
Dear Dr Bhavana Bills, DO     We had the pleasure of seeing Gulshan Hernandez, 1975 here    on 3/2/2021  Please see below for review of care and plans. Chief complaint-     ICD-10-CM    1. Sinus disease  J34.9    2. Lacrimal duct stenosis, right  H04.551    3. Allergic rhinitis due to other allergic trigger, unspecified seasonality  J30.89    4. DNS (deviated nasal septum)  J34.2    5. Dysfunction of Eustachian tube, unspecified laterality  H69.80          History of Present Illness- pt with 3 mo hx of worsening sensation in mouth with uri allergy sxs that induces cough and also a recurring sloughing of upper palate skin that is a nuisance. No bleeding. Nothing helps it. + exercises. Decent water intake. + caffeine. 3/2/21 pt oral issues have improved but now has issues with right lacrimal system. blockage improved with irrigatin of ducts but has been happening more often as she gets older. Here for eval and tx options. Did use irrigation and nose and eye drops recently and it did help. Review of Systems- No drainage, discharge, or headache. Complete 10 system ROS completed and negative except as noted above. Physical Examination-   Vital Signs-Resp 20   Ht 5' 4\" (1.626 m)   Wt 128 lb (58.1 kg)   BMI 21.97 kg/m²     Ears- Tympanic membranes clear bilaterally. No middle ear effusion. No pre or post auricular tenderness. Nose- Nasal mucosa clear and very dry.  + significant septal deviation or inferior turbinate hypertrophy. Oral Cavity/Oropharynx- Floor of mouth and tongue are soft and nontender. + 2mm lesion on left base of uvula, virile or polyp like- still present. Area of white hyperkeratosis of the midline upper palate, ~3-4 mm oval- healed with no issues. . No posterior pharyngeal erythema. + gag reflex  Neck- Soft and nontender. No masses, lesions, lymphadenopathy, or thyroid nodules appreciated. Cranial Nerve- Cranial nerves II to XII intact.   Extraocular muscles intact. No gross motor visual deficits. No spontaneous nystagmus. Face- No facial skin tenderness to palpation. Heart- No cyanosis, regular  Lungs- No stridor, no intercostal accessory muscle use  General- The patient is in no acute distress. A&O x3    Scope  Procedure note- after pt verbally consented, was sprayed nasally with 1:1 neosynephrine and xylocaine. Scope was passed and found nasal cavity with no lesion, no polyps, no blickage or mas under ri inf turb. np clear, tonsil wnl, tongue mobile and no masses, vocal cords mobile bilaterally with full ab and ad duction. Hypopharynx clear, open and no masses. Medical Decision Making and Treatment Plan. 1. Increase hydration  2. Nasal irrigation  3. Ketotifen eye drops- cintinue  4. Steroid nasal spray- stop. Will start steroid impregnated irrigations. 5. dw pt option of tx of masses. May go away on own or can remove surgically. Not worried about cancer at this point. If they persist with no improvement or worsen, pt will fu and further discuss surgery as pt would like to wait for now. Reasonable and pt understood to call if changes. - no intervention neeeded now. 6. Daily irrigations but can use ketotifen and increase irrigations when eye sxs flare up. 7. If no better dw pt options of surgery intranasally to open up duct and place stents. Not needed now but can do if worsens. Thank you for the opportunity to take part in the care of this very pleasant patient, Mj Costa  Sincerely,            Andrew Andujar.  Timmy Horne M.D., Ph.D., [facility]  Department of Otolaryngology-Head and Neck Surgery

## 2021-03-02 NOTE — LETTER
Dear Dr Brook Murguia, DO     We had the pleasure of seeing Kenneth Pelaez, 1975 here    on 3/2/2021  Please see below for review of care and plans. Chief complaint-     ICD-10-CM    1. Sinus disease  J34.9    2. Lacrimal duct stenosis, right  H04.551    3. Allergic rhinitis due to other allergic trigger, unspecified seasonality  J30.89    4. DNS (deviated nasal septum)  J34.2    5. Dysfunction of Eustachian tube, unspecified laterality  H69.80          History of Present Illness- pt with 3 mo hx of worsening sensation in mouth with uri allergy sxs that induces cough and also a recurring sloughing of upper palate skin that is a nuisance. No bleeding. Nothing helps it. + exercises. Decent water intake. + caffeine. 3/2/21 pt oral issues have improved but now has issues with right lacrimal system. blockage improved with irrigatin of ducts but has been happening more often as she gets older. Here for eval and tx options. Did use irrigation and nose and eye drops recently and it did help. Review of Systems- No drainage, discharge, or headache. Complete 10 system ROS completed and negative except as noted above. Physical Examination-   Vital Signs-Resp 20   Ht 5' 4\" (1.626 m)   Wt 128 lb (58.1 kg)   BMI 21.97 kg/m²     Ears- Tympanic membranes clear bilaterally. No middle ear effusion. No pre or post auricular tenderness. Nose- Nasal mucosa clear and very dry.  + significant septal deviation or inferior turbinate hypertrophy. Oral Cavity/Oropharynx- Floor of mouth and tongue are soft and nontender. + 2mm lesion on left base of uvula, virile or polyp like- still present. Area of white hyperkeratosis of the midline upper palate, ~3-4 mm oval- healed with no issues. . No posterior pharyngeal erythema. + gag reflex  Neck- Soft and nontender. No masses, lesions, lymphadenopathy, or thyroid nodules appreciated. Cranial Nerve- Cranial nerves II to XII intact. Extraocular muscles intact. No gross motor visual deficits. No spontaneous nystagmus. Face- No facial skin tenderness to palpation. Heart- No cyanosis, regular  Lungs- No stridor, no intercostal accessory muscle use  General- The patient is in no acute distress. A&O x3    Scope  Procedure note- after pt verbally consented, was sprayed nasally with 1:1 neosynephrine and xylocaine. Scope was passed and found nasal cavity with no lesion, no polyps, no blickage or mas under ri inf turb. np clear, tonsil wnl, tongue mobile and no masses, vocal cords mobile bilaterally with full ab and ad duction. Hypopharynx clear, open and no masses. Medical Decision Making and Treatment Plan. 1. Increase hydration  2. Nasal irrigation  3. Ketotifen eye drops- cintinue  4. Steroid nasal spray- stop. Will start steroid impregnated irrigations. 5. dw pt option of tx of masses. May go away on own or can remove surgically. Not worried about cancer at this point. If they persist with no improvement or worsen, pt will fu and further discuss surgery as pt would like to wait for now. Reasonable and pt understood to call if changes. - no intervention neeeded now. 6. Daily irrigations but can use ketotifen and increase irrigations when eye sxs flare up. 7. If no better dw pt options of surgery intranasally to open up duct and place stents. Not needed now but can do if worsens. Thank you for the opportunity to take part in the care of this very pleasant patient, Ana Paula Rodriguez  Sincerely,            Karin Meyers.  Milton Singh M.D., Ph.D., [facility]  Department of Otolaryngology-Head and Neck Surgery

## 2021-05-17 ASSESSMENT — ENCOUNTER SYMPTOMS
CHEST TIGHTNESS: 0
ABDOMINAL DISTENTION: 0
CONSTIPATION: 0
EYE ITCHING: 0
TROUBLE SWALLOWING: 0
DIARRHEA: 0
BACK PAIN: 0
WHEEZING: 0
SINUS PRESSURE: 0
CHOKING: 0
VOMITING: 0
NAUSEA: 0
RHINORRHEA: 0
ABDOMINAL PAIN: 0
EYE DISCHARGE: 0
SHORTNESS OF BREATH: 0
SINUS PAIN: 0
BLOOD IN STOOL: 0
SORE THROAT: 0
COUGH: 0
VOICE CHANGE: 0

## 2021-05-17 NOTE — PROGRESS NOTES
106/58Subjective:  Tami Abrams 30.4% Lifetime risk. Patient ID: Kobe Caceres is a 39 y.o. female. This note was copied forward from the last encounter. Essential components for this patient record were reviewed and verified on this visit including:  recent hospitalizations, recent imaging, PMH, PSH, FH, SOC HX, Allergies, and Medications were reviewed and updated as appropriate. In addition, the assessment and plan were copied from prior office note and updated accordingly. HPI     Kobe Caceres presents for 6 month follow up after breast cancer risk assessment. PCP: Jerzy Shah DO,  [address] [facility]. Patient does routinely do self breast exams. Patient denies nipple discharge. Patient admits to previous breast biopsy. Patient denies a personal history of breast cancer. Breast cancer risk factors include family hx on mother's side. She is concerned about some nodularity she has noted in the inferior aspect of her right breast.    Age of menarche was 17. Patient denies hormonal therapy. Patient is . Age of first live birth was 36. Patient did breast feed. Estimated body mass index is 21.97 kg/m² as calculated from the following:    Height as of 3/2/21: 5' 4\" (1.626 m). Weight as of 3/2/21: 128 lb (58.1 kg). Bra Size: 32A    Patient drinks significant caffeinated beverages. She does not smoke cigarettes. Because violence is so common, we ask all our patients: are you in a relationship or do you live with a person who threatens, hurts, or controls you:  Denies. 20: BILATERAL ULTRASOUND [facility]      ULTRASOUND FINDINGS:       In the left breast, there is no sonographic evidence of suspicious solid or cystic mass lesions.  No focal areas of suspicious atypical echogenicity.         Finding 1:   Sonography was performed in both breasts using a radial and anti-radial approach.  There is a stable oval well circumscribed solid mass measuring 14 x 6 x 9 mm seen in the right breast at 6 o'clock. Internal echogenicity is isoechoic.       There is no axillary lymphadenopathy.       No sonographic evidence of suspicious solid or cystic mass lesions.  No focal areas of suspicious atypical echogenicity.       The examination included imaging and evaluation of all four quadrants, the retroareolar region, and axilla of both breasts.       IMPRESSION:   Stable solid mass in the right breast is benign.       Screening mammogram in 6 months is recommended     All provided imaging reviewed with patient. Most recent imagin19  MAMMOGRAM VIEWS:   The following mammographic views where obtained:       ULTRASOUND TECHNIQUE:   High-resolution real-time ultrasound scanning was performed. Additional elastography and doppler color flow analysis of any finding was also obtained.       TOMOSYNTHESIS:   Although available and offered, the patient chose not to obtain Tomosynthesis (3 Dimensional Breast Imaging).       COMPARISON:   The present examination has been compared to prior imaging studies performed at [facility] on 2018 and 2019.       CAD:   This exam was reviewed using the [employer] Computer Aided Detection (CAD)       TISSUE DENSITY:   The breasts are extremely dense (Type 4 density).       MAMMOGRAM FINDINGS:   In the left breast, no suspicious masses, areas of suspicious architectural distortion, suspicious calcifications, or additional suspicious findings are identified.           Finding 1:   The finding in question is not seen on mammogram.       ULTRASOUND FINDINGS:           Finding 1:   Sonography was performed in the right breast using a radial and anti-radial approach. There is an oval well circumscribed solid mass measuring 10 x 5 x 9 mm seen in the right breast at 6 o'clock located 7 centimeters from the nipple.  Internal    echogenicity is hypoechoic.       IMPRESSION:   Solid mass in the right breast is probably benign. Follow-up mammogram and ultrasound in 1 year is recommended.         Patient has an elevated Tyrer [place] of 30.5%, so imaging every 6 months is indicated. Patient has deferred undergoing an MRI due to gadolinium. We are discussing MBI alternating with mammogram and ultrasound. US today. Past Medical History:   Diagnosis Date    Fibrocystic breast     Dr. Batsheva ADHIKARIBZDASUH([id number])     saw Dr. Caroline Ley and several ENTs--now sees chiropractor and accupuncturist    Kidney stone     x2    Murmur        Past Surgical History:   Procedure Laterality Date    BREAST SURGERY  2011    Benign--needle bx     Current Outpatient Medications on File Prior to Visit   Medication Sig Dispense Refill    fluticasone (FLONASE) 50 MCG/ACT nasal spray 1 spray by Each Nostril route daily       No current facility-administered medications on file prior to visit.          Allergies   Allergen Reactions    Flagyl [Metronidazole]      Rapid heart rate    Tetracyclines & Related Diarrhea    Seldane [Terfenadine] Anxiety     \"Hallucinations\"    Terazol [Terconazole] Rash       Family History   Problem Relation Age of Onset    Thyroid Disease Mother         hypothyroidism    High Cholesterol Mother     Cancer Mother 61        breast    High Blood Pressure Father     Cancer Maternal Aunt 44        Breast cancer       Social History     Socioeconomic History    Marital status:      Spouse name: Not on file    Number of children: Not on file    Years of education: Not on file    Highest education level: Not on file   Occupational History    Not on file   Tobacco Use    Smoking status: Former Smoker     Quit date: 10/1/2004     Years since quittin.6    Smokeless tobacco: Never Used    Tobacco comment: 1/2 pack per day x 10 years   Vaping Use    Vaping Use: Never used   Substance and Sexual Activity    Alcohol use: Yes     Comment: red wine twice a month    Drug use: No    Sexual activity: Not on file   Other Topics Concern    Not on file   Social History Narrative    Not on file     Social Determinants of Health     Financial Resource Strain:     Difficulty of Paying Living Expenses:    Food Insecurity:     Worried About Running Out of Food in the Last Year:     [address] in the Last Year:    Transportation Needs:     Lack of Transportation (Medical):  Lack of Transportation (Non-Medical):    Physical Activity:     Days of Exercise per Week:     Minutes of Exercise per Session:    Stress:     Feeling of Stress :    Social Connections:     Frequency of Communication with Friends and Family:     Frequency of Social Gatherings with Friends and Family:     Attends Hindu Services:     Active Member of Clubs or Organizations:     Attends Club or Organization Meetings:     Marital Status:    Intimate Partner Violence:     Fear of Current or Ex-Partner:     Emotionally Abused:     Physically Abused:     Sexually Abused:        OCCUPATION:  Not currently working. . Review of Systems   Constitutional: Negative for activity change, appetite change, chills, fatigue, fever and unexpected weight change. She generally feels well. HENT: Negative for congestion, postnasal drip, rhinorrhea, sinus pressure, sinus pain, sore throat, trouble swallowing and voice change. Eyes: Negative for discharge, itching and visual disturbance. Respiratory: Negative for cough, choking, chest tightness, shortness of breath and wheezing. Cardiovascular: Negative for chest pain, palpitations and leg swelling. Gastrointestinal: Negative for abdominal distention, abdominal pain, blood in stool, constipation, diarrhea, nausea and vomiting. Endocrine: Negative for cold intolerance and heat intolerance. Genitourinary: Negative for difficulty urinating, dysuria, frequency and hematuria.    Musculoskeletal: Negative for arthralgias, back pain, gait problem, joint swelling, myalgias, neck pain and neck stiffness. Allergic/Immunologic: Negative for environmental allergies and food allergies. Neurological: Negative for dizziness, seizures, syncope, speech difficulty, weakness, light-headedness and headaches. Hematological: Negative for adenopathy. Does not bruise/bleed easily. Psychiatric/Behavioral: Negative for agitation, confusion and decreased concentration. The patient is not nervous/anxious. Review of systems as noted above completely reviewed with patient. No changes. Objective:   Physical Exam  Vitals and nursing note reviewed. Constitutional:       General: She is not in acute distress. Appearance: She is well-developed. She is not diaphoretic. Comments: Pleasant and cooperative. Accompanied by her mother. HENT:      Head: Normocephalic and atraumatic. Mouth/Throat:      Pharynx: No oropharyngeal exudate. Eyes:      General: No scleral icterus. Right eye: No discharge. Left eye: No discharge. Conjunctiva/sclera: Conjunctivae normal.   Neck:      Thyroid: No thyromegaly. Vascular: No JVD. Trachea: No tracheal deviation. Cardiovascular:      Rate and Rhythm: Normal rate and regular rhythm. Heart sounds: Murmur (Faint) heard. No friction rub. No gallop. Pulmonary:      Effort: Pulmonary effort is normal. No respiratory distress or retractions. Breath sounds: Normal breath sounds. No stridor. No wheezing or rales. Chest:      Chest wall: No mass, lacerations, deformity, swelling, tenderness or edema. Breasts: Breasts are symmetrical.         Right: No inverted nipple, mass, nipple discharge, skin change or tenderness. Left: No inverted nipple, mass, nipple discharge, skin change or tenderness. Comments: Findings on clinical exam correlate with imaging. Age-appropriate breast nodularity and density. No skin changes and no axillary adenopathy.   Abdominal:     There is no axillary lymphadenopathy.       No sonographic evidence of suspicious solid or cystic mass lesions.  No focal areas of suspicious atypical echogenicity.       The examination included imaging and evaluation of all four quadrants, the retroareolar region, and axilla of both breasts.       IMPRESSION:   Stable solid mass in the right breast is benign.       Screening mammogram in 6 months is recommended   12/8/2020: BILATERAL DIAGNOSTIC MAMMOGRAM  MAMMOGRAM FINDINGS:   Finding 1:   No suspicious masses, areas of suspicious architectural distortion, suspicious calcifications, or additional suspicious findings are identified.  There are no significant changes from the prior study.       IMPRESSION:   No mammographic evidence of malignancy.       Screening mammogram in 1 year is recommended as well as annual breast MRI.       =======================================   BI-RADS Category 1:  Negative   =======================================     5/18/2021: RIGHT AND LEFT BREAST COMPLETE ULTRASOUND:[facility]      FINDINGS:   High-resolution real-time ultrasound scanning of both breasts was performed. Examination included imaging and evaluation of all 4 quadrants, the   retroareolar region, and axilla of both breasts.  There are scattered   bilateral simple and complicated cysts.  No suspicious cystic or solid mass   identified.  There is no axillary lymphadenopathy.           Impression   Benign findings with no sonographic evidence of malignancy in either breast.       RECOMMENDATION:       Annual bilateral mammogram and breast MRI are advised.       BIRADS:   BIRADS - CATEGORY 2       Benign Findings.  Normal interval follow-up is recommended in 12 months.       OVERALL ASSESSMENT - BENIGN       -TC:  During this visit, Tyrer-Kathyz[place] Risk Evaluation was reviewed. Pt has a 30.4 % lifetime risk (to age 82) of developing breast cancer.      -Chemoprevention:   We also discussed she would be a candidate for chemoprevention with Tamoxifen, which she is adamantly opposed to.      -B/L screening mammogram today, Negative; BI-RADS 1    Clinically,the nodularity on examination is stable and correlates with imaging findings. She does complain of some discomfort of the right breast from the solid, benign mass in the 6:00 position. She would like to discuss excision for symptomatic relief. Very dense breasts and strong family history with elevated Tyrer [place] score. Plan on follow-up in 6 months with B/L breast mammogram  and office visit at that time. Questions answered to patient satisfaction. Plan:   1. Continue monthly breast self examination; detailed instructions reviewed today. Bring any changes to your physician's attention. 2. Continue healthy diet and exercise routinely as tolerated to maintain IBW. 3. Avoid alcohol. 4. Limit caffeine intake. 5. Repeat mammogram November 2021.  6. Repeat bilateral breast US in May 2022. 7. Continue follow up with Primary Care and Gynecology. During today's visit, face-to-face time 20 minutes, greater than 50% in counseling education and coordination of care. All questions were answered to her apparent satisfaction, and she is agreeable to the plan as outlined above. Dr. Meryle Lurie.  Aris Menchaca, [id number] [facility] FACS  May 18, 2021

## 2021-05-18 ENCOUNTER — HOSPITAL ENCOUNTER (OUTPATIENT)
Dept: GENERAL RADIOLOGY | Age: 46
Discharge: HOME | End: 2021-05-20
Payer: COMMERCIAL

## 2021-05-18 ENCOUNTER — OFFICE VISIT (OUTPATIENT)
Dept: BREAST CENTER | Age: 46
End: 2021-05-18
Payer: COMMERCIAL

## 2021-05-18 VITALS
WEIGHT: 120 LBS | OXYGEN SATURATION: 95 % | BODY MASS INDEX: 20.49 KG/M2 | HEART RATE: 62 BPM | SYSTOLIC BLOOD PRESSURE: 106 MMHG | TEMPERATURE: 98.2 F | HEIGHT: 64 IN | DIASTOLIC BLOOD PRESSURE: 58 MMHG | RESPIRATION RATE: 18 BRPM

## 2021-05-18 DIAGNOSIS — Z91.89 AT HIGH RISK FOR BREAST CANCER: ICD-10-CM

## 2021-05-18 DIAGNOSIS — Z12.39 BREAST CANCER SCREENING, HIGH RISK PATIENT: ICD-10-CM

## 2021-05-18 DIAGNOSIS — Z80.3 FAMILY HISTORY OF BREAST CANCER IN FIRST DEGREE RELATIVE: ICD-10-CM

## 2021-05-18 DIAGNOSIS — R92.2 DENSE BREAST TISSUE ON MAMMOGRAM: ICD-10-CM

## 2021-05-18 DIAGNOSIS — R92.2 DENSE BREAST TISSUE: ICD-10-CM

## 2021-05-18 DIAGNOSIS — Z12.31 ENCOUNTER FOR SCREENING MAMMOGRAM FOR BREAST CANCER: Primary | ICD-10-CM

## 2021-05-18 PROCEDURE — 76641 ULTRASOUND BREAST COMPLETE: CPT

## 2021-05-18 PROCEDURE — 99213 OFFICE O/P EST LOW 20 MIN: CPT | Performed by: SURGERY

## 2021-05-18 NOTE — PATIENT INSTRUCTIONS

## 2021-11-30 ASSESSMENT — ENCOUNTER SYMPTOMS
DIARRHEA: 0
BACK PAIN: 0
VOICE CHANGE: 0
TROUBLE SWALLOWING: 0
CONSTIPATION: 0
COUGH: 0
SINUS PRESSURE: 0
SINUS PAIN: 0
SORE THROAT: 0
ABDOMINAL DISTENTION: 0
RHINORRHEA: 0
EYE DISCHARGE: 0
SHORTNESS OF BREATH: 0
NAUSEA: 0
EYE ITCHING: 0
CHEST TIGHTNESS: 0
ABDOMINAL PAIN: 0
WHEEZING: 0
VOMITING: 0
CHOKING: 0
BLOOD IN STOOL: 0

## 2021-11-30 NOTE — PROGRESS NOTES
Subjective:  Maurizioricha Jose Alberto 30.4% Lifetime risk. Patient ID: Satcy Vora is a 42 y.o. female. This note was copied forward from the last encounter. Essential components for this patient record were reviewed and verified on this visit including:  recent hospitalizations, recent imaging, PMH, PSH, FH, SOC HX, Allergies, and Medications were reviewed and updated as appropriate. In addition, the assessment and plan were copied from prior office note and updated accordingly. HPI     Stacy Vora presents for 6 month follow up after breast cancer risk assessment. PCP: Vincenzo Alberto DO,  Gynecologist: Dr. Jennifer Addison. Patient does routinely do self breast exams. Patient denies nipple discharge. Patient admits to previous breast biopsy. Patient denies a personal history of breast cancer. Breast cancer risk factors include family hx on mother's side. Age of menarche was 17. Patient denies hormonal therapy. Patient is . Age of first live birth was 36. Patient did breast feed. Estimated body mass index is 20.6 kg/m² as calculated from the following:    Height as of 21: 5' 4\" (1.626 m). Weight as of 21: 120 lb (54.4 kg). Bra Size: 32A    Patient drinks significant caffeinated beverages. She does not smoke cigarettes. Because violence is so common, we ask all our patients: are you in a relationship or do you live with a person who threatens, hurts, or controls you:  Denies. 20: BILATERAL ULTRASOUND [facility]      ULTRASOUND FINDINGS:       In the left breast, there is no sonographic evidence of suspicious solid or cystic mass lesions.  No focal areas of suspicious atypical echogenicity.         Finding 1:   Sonography was performed in both breasts using a radial and anti-radial approach. There is a stable oval well circumscribed solid mass measuring 14 x 6 x 9 mm seen in the right breast at 6 o'clock.  Internal echogenicity is isoechoic.       There is no axillary lymphadenopathy.       No sonographic evidence of suspicious solid or cystic mass lesions.  No focal areas of suspicious atypical echogenicity.       The examination included imaging and evaluation of all four quadrants, the retroareolar region, and axilla of both breasts.       IMPRESSION:   Stable solid mass in the right breast is benign.       Screening mammogram in 6 months is recommended       Imagin19  MAMMOGRAM VIEWS:   The following mammographic views where obtained:       ULTRASOUND TECHNIQUE:   High-resolution real-time ultrasound scanning was performed. Additional elastography and doppler color flow analysis of any finding was also obtained.       TOMOSYNTHESIS:   Although available and offered, the patient chose not to obtain Tomosynthesis (3 Dimensional Breast Imaging).       COMPARISON:   The present examination has been compared to prior imaging studies performed at [address] [place] [address] on 2018 and 2019.       CAD:   This exam was reviewed using the [employer] Computer Aided Detection (CAD)       TISSUE DENSITY:   The breasts are extremely dense (Type 4 density).       MAMMOGRAM FINDINGS:   In the left breast, no suspicious masses, areas of suspicious architectural distortion, suspicious calcifications, or additional suspicious findings are identified.           Finding 1:   The finding in question is not seen on mammogram.       ULTRASOUND FINDINGS:           Finding 1:   Sonography was performed in the right breast using a radial and anti-radial approach. There is an oval well circumscribed solid mass measuring 10 x 5 x 9 mm seen in the right breast at 6 o'clock located 7 centimeters from the nipple. Internal    echogenicity is hypoechoic.       IMPRESSION:   Solid mass in the right breast is probably benign.    Follow-up mammogram and ultrasound in 1 year is recommended.         Patient has an elevated Tyrer Casscoe of 30.5%, so imaging every 6 months is indicated. Patient has deferred undergoing an MRI due to gadolinium. We are discussing MBI alternating with mammogram and ultrasound. Past Medical History:   Diagnosis Date    Fibrocystic breast     Dr. Timbo GOODRICH([id number])     saw Dr. Misty Junior and several ENTs--now sees chiropractor and accupuncturist    Kidney stone     x2    Murmur        Past Surgical History:   Procedure Laterality Date    BREAST SURGERY  2011    Benign--needle bx     Current Outpatient Medications on File Prior to Visit   Medication Sig Dispense Refill    fluticasone (FLONASE) 50 MCG/ACT nasal spray 1 spray by Each Nostril route daily       No current facility-administered medications on file prior to visit.          Allergies   Allergen Reactions    Flagyl [Metronidazole]      Rapid heart rate    Tetracyclines & Related Diarrhea    Seldane [Terfenadine] Anxiety     \"Hallucinations\"    Terazol [Terconazole] Rash       Family History   Problem Relation Age of Onset    Thyroid Disease Mother         hypothyroidism    High Cholesterol Mother     Breast Cancer Mother 61        breast    High Blood Pressure Father     Breast Cancer Maternal Aunt 44        Breast cancer       Social History     Socioeconomic History    Marital status:      Spouse name: Not on file    Number of children: Not on file    Years of education: Not on file    Highest education level: Not on file   Occupational History    Not on file   Tobacco Use    Smoking status: Former Smoker     Quit date: 10/1/2004     Years since quittin.1    Smokeless tobacco: Never Used    Tobacco comment: 1/2 pack per day x 10 years   Vaping Use    Vaping Use: Never used   Substance and Sexual Activity    Alcohol use: Yes     Comment: red wine twice a month    Drug use: No    Sexual activity: Not on file   Other Topics Concern    Not on file   Social History Narrative    Not on file     Social Determinants of Health     Financial Resource Strain:     Difficulty of Paying Living Expenses: Not on file   Food Insecurity:     Worried About Running Out of Food in the Last Year: Not on file    Misael of Food in the Last Year: Not on file   Transportation Needs:     Lack of Transportation (Medical): Not on file    Lack of Transportation (Non-Medical): Not on file   Physical Activity:     Days of Exercise per Week: Not on file    Minutes of Exercise per Session: Not on file   Stress:     Feeling of Stress : Not on file   Social Connections:     Frequency of Communication with Friends and Family: Not on file    Frequency of Social Gatherings with Friends and Family: Not on file    Attends Jew Services: Not on file    Active Member of [address] [place] [address] or Organizations: Not on file    Attends Club or Organization Meetings: Not on file    Marital Status: Not on file   Intimate Partner Violence:     Fear of Current or Ex-Partner: Not on file    Emotionally Abused: Not on file    Physically Abused: Not on file    Sexually Abused: Not on file   Housing Stability:     Unable to Pay for Housing in the Last Year: Not on file    Number of [place] in the Last Year: Not on file    Unstable Housing in the Last Year: Not on file       OCCUPATION:        Review of Systems   Constitutional: Negative for activity change, appetite change, chills, fatigue, fever and unexpected weight change. [place] Cardoso continues to do well. Exercises regularly and maintains a healthy diet. No breast related complaints on this visit. HENT: Negative for congestion, postnasal drip, rhinorrhea, sinus pressure, sinus pain, sore throat, trouble swallowing and voice change. Eyes: Negative for discharge, itching and visual disturbance. Respiratory: Negative for cough, choking, chest tightness, shortness of breath and wheezing. Cardiovascular: Negative for chest pain, palpitations and leg swelling.    Gastrointestinal: Negative for abdominal distention, abdominal pain, blood in stool, constipation, diarrhea, nausea and vomiting. Endocrine: Negative for cold intolerance and heat intolerance. Genitourinary: Negative for difficulty urinating, dysuria, frequency and hematuria. Musculoskeletal: Negative for arthralgias, back pain, gait problem, joint swelling, myalgias, neck pain and neck stiffness. Allergic/Immunologic: Negative for environmental allergies and food allergies. Neurological: Negative for dizziness, seizures, syncope, speech difficulty, weakness, light-headedness and headaches. Hematological: Negative for adenopathy. Does not bruise/bleed easily. Psychiatric/Behavioral: Negative for agitation, confusion and decreased concentration. The patient is not nervous/anxious. Objective:   Physical Exam  Vitals and nursing note reviewed. Constitutional:       General: She is not in acute distress. Appearance: She is well-developed. She is not diaphoretic. Comments: Pleasant and cooperative. HENT:      Head: Normocephalic and atraumatic. Mouth/Throat:      Pharynx: No oropharyngeal exudate. Eyes:      General: No scleral icterus. Right eye: No discharge. Left eye: No discharge. Conjunctiva/sclera: Conjunctivae normal.   Neck:      Thyroid: No thyromegaly. Vascular: No JVD. Trachea: No tracheal deviation. Cardiovascular:      Rate and Rhythm: Normal rate and regular rhythm. Heart sounds: Murmur (Faint) heard. No friction rub. No gallop. Pulmonary:      Effort: Pulmonary effort is normal. No respiratory distress or retractions. Breath sounds: Normal breath sounds. No stridor. No wheezing or rales. Chest:      Chest wall: No mass, lacerations, deformity, swelling, tenderness or edema. Breasts: Breasts are symmetrical.      Right: No inverted nipple, mass, nipple discharge, skin change or tenderness.       Left: No inverted nipple, mass, nipple discharge, skin change or tenderness. Comments: Overall, her clinical exam remains stable and without evidence of malignancy. Abdominal:      General: There is no distension. Palpations: Abdomen is soft. Tenderness: There is no abdominal tenderness. There is no guarding or rebound. Musculoskeletal:         General: No tenderness or deformity. Normal range of motion. Right shoulder: Normal.      Left shoulder: Normal.      Cervical back: Normal range of motion and neck supple. Lymphadenopathy:      Cervical: No cervical adenopathy. Right cervical: No superficial, deep or posterior cervical adenopathy. Left cervical: No superficial, deep or posterior cervical adenopathy. Upper Body:      Right upper body: No pectoral adenopathy. Left upper body: No pectoral adenopathy. Skin:     General: Skin is warm and dry. Coloration: Skin is not pale. Findings: No erythema or rash. Neurological:      Mental Status: She is alert and oriented to person, place, and time. Coordination: Coordination normal.   Psychiatric:         Behavior: Behavior normal.         Thought Content: Thought content normal.         Judgment: Judgment normal.         Assessment:     42 y.o. extremely pleasant female with increased risk for development of breast cancer who presents today for 6 month follow up. Her risks for breast cancer include gender,  Mother and Maternal Aunt (maternal aunt  age 39 due to breast cancer). Her mother had Genetic testing which was negative. She has opted for alternating breast screening ultrasound and screening mammogram, every 6 months.    -20: BILATERAL ULTRASOUND:[facility]    ULTRASOUND FINDINGS:       In the left breast, there is no sonographic evidence of suspicious solid or cystic mass lesions.  No focal areas of suspicious atypical echogenicity.         Finding 1:   Sonography was performed in both breasts using a radial and anti-radial approach.  There is a stable oval well circumscribed solid mass measuring 14 x 6 x 9 mm seen in the right breast at 6 o'clock. Internal echogenicity is isoechoic.       There is no axillary lymphadenopathy.       No sonographic evidence of suspicious solid or cystic mass lesions.  No focal areas of suspicious atypical echogenicity.       The examination included imaging and evaluation of all four quadrants, the retroareolar region, and axilla of both breasts.       IMPRESSION:   Stable solid mass in the right breast is benign.       Screening mammogram in 6 months is recommended     -12/8/2020: BILATERAL DIAGNOSTIC MAMMOGRAM:  Negative, BI-RADS 1.  -05/18/2021: RIGHT AND LEFT BREAST COMPLETE ULTRASOUND:JACBCC:  Benign, BI-RADS 2.  -TC:  [facility] Risk Evaluation was reviewed. Pt has a 30.4 % lifetime risk (to age 82) of developing breast cancer.    -Chemoprevention:  While she would be a candidate for chemoprevention with Tamoxifen, she is adamantly opposed to.    -12/09/2021 B/L screening mammogram:  Negative, BI-RADS 1.  -12/09/2021 clinical follow up her exam remains stable and without clinically suspicious findings. Imaging reviewed, including BI-RADS result. She continues healthy diet and exercise, continues BSE. She is declining chemoprevention and prefers high risk screening with B/L screening mammogram and B/L whole breast US, alternating every six months. Plan:   1. Continue monthly breast self examination; detailed instructions reviewed today. Bring any changes to your physician's attention. 2. Continue healthy diet and exercise routinely as tolerated to maintain IBW. 3. Avoid alcohol. 4. Limit caffeine intake. 5. Repeat mammogram December 10 (or after) 2022. 6. Repeat bilateral breast US in June 2022. 7. Continue follow up with Primary Care and Gynecology. 8. RTC June 2022 wit B/L breast US same day.       During today's visit, face-to-face time 25 minutes, greater than 50% in counseling education and coordination of care. All questions were answered to her apparent satisfaction, and she is agreeable to the plan as outlined above. Chemo Aiken, RN, MSN, APRN-CNP, [address]  Advanced Oncology Certified Nurse Practitioner  Department of Breast Surgery  [facility]  [facility] in collaboration with Dr. Flo Vargas.  Bogdan/Dr. Jacqui Crow

## 2021-12-09 ENCOUNTER — HOSPITAL ENCOUNTER (OUTPATIENT)
Dept: GENERAL RADIOLOGY | Age: 46
Discharge: HOME | End: 2021-12-11
Payer: COMMERCIAL

## 2021-12-09 ENCOUNTER — OFFICE VISIT (OUTPATIENT)
Dept: BREAST CENTER | Age: 46
End: 2021-12-09
Payer: COMMERCIAL

## 2021-12-09 VITALS
HEIGHT: 64 IN | SYSTOLIC BLOOD PRESSURE: 104 MMHG | TEMPERATURE: 98 F | DIASTOLIC BLOOD PRESSURE: 60 MMHG | RESPIRATION RATE: 20 BRPM | HEART RATE: 67 BPM | BODY MASS INDEX: 20.49 KG/M2 | WEIGHT: 120 LBS | OXYGEN SATURATION: 98 %

## 2021-12-09 DIAGNOSIS — R92.2 DENSE BREAST TISSUE ON MAMMOGRAM: Primary | ICD-10-CM

## 2021-12-09 DIAGNOSIS — Z12.31 ENCOUNTER FOR SCREENING MAMMOGRAM FOR BREAST CANCER: ICD-10-CM

## 2021-12-09 DIAGNOSIS — Z80.3 FAMILY HISTORY OF BREAST CANCER IN FIRST DEGREE RELATIVE: ICD-10-CM

## 2021-12-09 DIAGNOSIS — R92.2 DENSE BREAST TISSUE: ICD-10-CM

## 2021-12-09 DIAGNOSIS — Z12.39 BREAST CANCER SCREENING, HIGH RISK PATIENT: ICD-10-CM

## 2021-12-09 DIAGNOSIS — Z91.89 AT HIGH RISK FOR BREAST CANCER: ICD-10-CM

## 2021-12-09 PROCEDURE — 77063 BREAST TOMOSYNTHESIS BI: CPT

## 2021-12-09 PROCEDURE — 99213 OFFICE O/P EST LOW 20 MIN: CPT | Performed by: NURSE PRACTITIONER

## 2021-12-09 NOTE — PATIENT INSTRUCTIONS

## 2021-12-26 ENCOUNTER — OFFICE VISIT (OUTPATIENT)
Dept: FAMILY MEDICINE CLINIC | Age: 46
End: 2021-12-26
Payer: COMMERCIAL

## 2021-12-26 VITALS
WEIGHT: 120 LBS | HEART RATE: 70 BPM | HEIGHT: 64 IN | DIASTOLIC BLOOD PRESSURE: 68 MMHG | OXYGEN SATURATION: 98 % | SYSTOLIC BLOOD PRESSURE: 128 MMHG | BODY MASS INDEX: 20.49 KG/M2 | TEMPERATURE: 96.8 F

## 2021-12-26 DIAGNOSIS — J02.9 SORE THROAT: ICD-10-CM

## 2021-12-26 DIAGNOSIS — U07.1 COVID-19: Primary | ICD-10-CM

## 2021-12-26 LAB
Lab: ABNORMAL
QC PASS/FAIL: ABNORMAL
S PYO AG THROAT QL: NORMAL
SARS-COV-2 RDRP RESP QL NAA+PROBE: POSITIVE

## 2021-12-26 PROCEDURE — 87880 STREP A ASSAY W/OPTIC: CPT | Performed by: NURSE PRACTITIONER

## 2021-12-26 PROCEDURE — 99213 OFFICE O/P EST LOW 20 MIN: CPT | Performed by: NURSE PRACTITIONER

## 2021-12-26 PROCEDURE — 87635 SARS-COV-2 COVID-19 AMP PRB: CPT | Performed by: NURSE PRACTITIONER

## 2021-12-26 NOTE — PROGRESS NOTES
21  Tu Luke : 1975 Sex: female  Age 42 y.o. Subjective:  Chief Complaint   Patient presents with    Pharyngitis     x1 day    Generalized Body Aches    Sinus Problem     Drainage       HPI:   Tu Luke , 42 y.o. female presents to the clinic for evaluation of sore throat x 1 day. The patient also reports sinus drainage and mild cough. The patient has taken nasal rinse for symptoms. The patient reports unchanged symptoms over time. The patient denies ill exposure. The patient denies hx of COVID-19 and reports having the vaccines. The patient denies acute loss of taste and smell, headache, rash, and fever. The patient also denies chest pain, abdominal pain, shortness of breath, and nausea / vomiting / diarrhea. ROS:   Unless otherwise stated in this report the patient's positive and negative responses for review of systems for constitutional, eyes, ENT, cardiovascular, respiratory, gastrointestinal, neurological, , musculoskeletal, and integument systems and related systems to the presenting problem are either stated in the history of present illness or were not pertinent or were negative for the symptoms and/or complaints related to the presenting medical problem. Positives and pertinent negatives as per HPI. All others reviewed and are negative.       PMH:     Past Medical History:   Diagnosis Date    Fibrocystic breast     Dr. Tiffany Cronin [id number]([id number])     saw Dr. Meggan De Leon and several ENTs--now sees chiropractor and accupuncturist    Kidney stone     x2    Murmur        Past Surgical History:   Procedure Laterality Date    BREAST SURGERY  2011    Benign--needle bx       Family History   Problem Relation Age of Onset    Thyroid Disease Mother         hypothyroidism    High Cholesterol Mother     Breast Cancer Mother 61        breast    High Blood Pressure Father     Prostate Cancer Father     Breast Cancer Maternal Aunt 39        Breast cancer sounds: Normal breath sounds. No wheezing, rhonchi or rales. Abdominal:      General: Bowel sounds are normal.      Palpations: Abdomen is soft. Musculoskeletal:         General: Normal range of motion. Cervical back: Normal range of motion and neck supple. Lymphadenopathy:      Cervical: No cervical adenopathy. Skin:     General: Skin is warm and dry. Capillary Refill: Capillary refill takes less than 2 seconds. Neurological:      General: No focal deficit present. Mental Status: She is alert and oriented to person, place, and time. Psychiatric:         Mood and Affect: Mood normal.         Behavior: Behavior normal.          Testing:   (All laboratory and radiology results have been personally reviewed by myself)  Labs:  Results for orders placed or performed in visit on 12/26/21   POCT COVID-19, Rapid   Result Value Ref Range    SARS-COV-2, RdRp gene Positive (A) Negative    Lot Number [id number]     QC Pass/Fail PASS    POCT rapid strep A   Result Value Ref Range    Strep A Ag None Detected None Detected       Imaging: All Radiology results interpreted by Radiologist unless otherwise noted. No orders to display       Assessment / Plan:   The patient's vitals, allergies, medications, and past medical history have been reviewed. [place] Cardoso was seen today for pharyngitis, generalized body aches and sinus problem. Diagnoses and all orders for this visit:    COVID-19    Sore throat  -     POCT COVID-19, Rapid  -     POCT rapid strep A  -     Magic Mouthwash (MIRACLE MOUTHWASH); Swish and swallow 5 mLs 4 times daily as needed for Irritation 1- part Maalox, 1- part benadryl, and 1- part lidocaine viscous. - Disposition: Home    - Educational material printed for patient's review and were included in patient instructions. After Visit Summary and given to patient at the end of visit. - Advised cautionary self-quarantine at home per [facility] guidelines.  Encouraged oral fluids and rest. Discussed symptomatic treatments with patient today including Tylenol prn for fever / pain. Schedule a follow-up with PCP in 2-3 days. Red flag symptoms were discussed with the patient today. The patient is directed to go the ED if symptoms change or worsen. Pt verbalizes understanding and is in agreement with plan of care. All questions answered. SIGNATURE: LOLY Dickson    *NOTE: This report was transcribed using voice recognition software. Every effort was made to ensure accuracy; however, inadvertent computerized transcription errors may be present.

## 2021-12-26 NOTE — PATIENT INSTRUCTIONS
blood clots. People with severe illness are at risk for blood clots. How can you protect yourself and others? · Get vaccinated. · Avoid sick people. · Cover your mouth with a tissue when you cough or sneeze. · Wash your hands often, especially after you cough or sneeze. Use soap and water, and scrub for at least 20 seconds. If soap and water aren't available, use an alcohol-based hand . · Avoid touching your mouth, nose, and eyes. Be sure to follow all instructions from the [facility] and your local health authorities. Here are some examples of specific precautions you may need to take. · If you are not fully vaccinated:  ? Wear a mask if you have to go to public areas. ? Avoid crowds and try to stay at least 6 feet away from other people. · If you have been exposed to the virus and are not fully vaccinated:  ? Stay home. Don't go to school, work, or public areas. And don't use public transportation, ride-shares, or taxis unless you have no choice. ? Wear a mask if you have to go to public areas, like the pharmacy. · Even if you're fully vaccinated, there's still a small chance you can get and spread COVID-19. If you live in an area where COVID-19 is spreading quickly, wear a mask if you have to go to indoor public areas. You might also want to wear a mask in crowded outdoor areas if you:  ? Have certain health conditions. ? Live with someone who has a compromised immune system. ? Live with someone who is not fully vaccinated. · If you have been exposed and you are fully vaccinated:  ? Talk to your doctor. You may need a COVID-19 test.  ? Wear a mask in public indoor spaces for 14 days or until you test negative for COVID-19. If you're sick:  · Leave your home only if you need to get medical care. But call the doctor's office first so they know you're coming. And wear a mask. · Wear a mask whenever you're around other people. · Limit contact with pets and people in your home.  If possible, stay in a separate bedroom and use a separate bathroom. · Clean and disinfect your home every day. Use household  and disinfectant wipes or sprays. Take special care to clean things that you touch with your hands. How can you self-isolate when you have COVID-19? If you have COVID-19, there are things you can do to help avoid spreading the virus to others. · Limit contact with people in your home. If possible, stay in a separate bedroom and use a separate bathroom. · Wear a mask when you are around other people. · If you have to leave home, avoid crowds and try to stay at least 6 feet away from other people. · Avoid contact with pets and other animals. · Cover your mouth and nose with a tissue when you cough or sneeze. Then throw it in the trash right away. · Wash your hands often, especially after you cough or sneeze. Use soap and water, and scrub for at least 20 seconds. If soap and water aren't available, use an alcohol-based hand . · Don't share personal household items. These include bedding, towels, cups and glasses, and eating utensils. · [address] in the warmest water allowed for the fabric type, and dry it completely. It's okay to wash other people's laundry with yours. · Clean and disinfect your home. Use household  and disinfectant wipes or sprays. When should you call for help? Call 911 anytime you think you may need emergency care. For example, call if you have life-threatening symptoms, such as:    · You have severe trouble breathing. (You can't talk at all.)     · You have constant chest pain or pressure.     · You are severely dizzy or lightheaded.     · You are confused or can't think clearly.     · You have pale, gray, or blue-colored skin or lips.     · You pass out (lose consciousness) or are very hard to wake up. Call your doctor now or seek immediate medical care if:    · You have moderate trouble breathing.  (You can't speak a full sentence.)     · You are coughing up blood (more than about 1 teaspoon).     · You have signs of low blood pressure. These include feeling lightheaded; being too weak to stand; and having cold, pale, clammy skin. Watch closely for changes in your health, and be sure to contact your doctor if:    · Your symptoms get worse.     · You are not getting better as expected.     · You have new or worse symptoms of anxiety, depression, nightmares, or flashbacks. Call before you go to the doctor's office. Follow their instructions. And wear a mask. Current as of: July 1, 2021               Content Version: 13.1  © 2006-2021 [employer]. Care instructions adapted under license by [facility] ([facility]). If you have questions about a medical condition or this instruction, always ask your healthcare professional. [address] any warranty or liability for your use of this information.

## 2022-01-02 ENCOUNTER — OFFICE VISIT (OUTPATIENT)
Dept: FAMILY MEDICINE CLINIC | Age: 47
End: 2022-01-02
Payer: COMMERCIAL

## 2022-01-02 VITALS
RESPIRATION RATE: 16 BRPM | DIASTOLIC BLOOD PRESSURE: 78 MMHG | SYSTOLIC BLOOD PRESSURE: 98 MMHG | TEMPERATURE: 97.7 F | WEIGHT: 120 LBS | HEART RATE: 50 BPM | BODY MASS INDEX: 20.49 KG/M2 | HEIGHT: 64 IN

## 2022-01-02 DIAGNOSIS — U07.1 COVID-19: Primary | ICD-10-CM

## 2022-01-02 PROCEDURE — 99213 OFFICE O/P EST LOW 20 MIN: CPT | Performed by: PHYSICIAN ASSISTANT

## 2022-01-02 NOTE — PROGRESS NOTES
Chief Complaint       Pharyngitis (asking for send out), Generalized Body Aches, Sinus Problem, and Pharyngitis      History of Present Illness   Source of history provided by: patient. Dario Ruiz is a 42 y.o. old female presenting to the walk in clinic for evaluation after testing positive for COVID-19 on a rapid antigen test last Sunday, 12/26/2021. She would like PCR confirmation as there have been conflicting test results within her immediate family. Today is day 9 of her symptoms. She reports to be feeling significantly improved at this time. Denies any recent fever, chills, body aches, CP, dyspnea, LE edema, abdominal pain, vomiting, rash, or lethargy. Denies any hx of asthma or tobacco use. Patient is a former smoker. Patient has been vaccinated for COVID-19. ROS    Unless otherwise stated in this report or unable to obtain because of the patient's clinical or mental status as evidenced by the medical record, this patients's positive and negative responses for Review of Systems, constitutional, psych, eyes, ENT, cardiovascular, respiratory, gastrointestinal, neurological, genitourinary, musculoskeletal, integument systems and systems related to the presenting problem are either stated in the preceding or were not pertinent or were negative for the symptoms and/or complaints related to the medical problem. Past Medical History:  has a past medical history of Fibrocystic breast, Headache(784.0), Kidney stone, and Murmur. Past Surgical History:  has a past surgical history that includes Breast surgery (12/2011). Social History:  reports that she quit smoking about 17 years ago. She has never used smokeless tobacco. She reports current alcohol use. She reports that she does not use drugs.   Family History: family history includes Breast Cancer (age of onset: 44) in her maternal aunt; Breast Cancer (age of onset: 61) in her mother; High Blood Pressure in her father; High Cholesterol in her mother; Prostate Cancer in her father; Thyroid Disease in her mother. Allergies: Flagyl [metronidazole], Tetracyclines & related, Seldane [terfenadine], and Terazol [terconazole]    Physical Exam         VS:  BP 98/78   Pulse 50   Temp 97.7 °F (36.5 °C) (Temporal)   Resp 16   Ht 5' 4\" (1.626 m)   Wt 120 lb (54.4 kg)   BMI 20.60 kg/m²    Oxygen Saturation Interpretation: Normal.    Constitutional:  Alert, development consistent with age. NAD. Head:  NC/NT. Airway patent. Mouth: Posterior pharynx with mild erythema and clear postnasal drip. No tonsillar hypertrophy or exudate. Neck:  Normal ROM. Supple. No anterior cervical adenopathy noted. Lungs: CTAB without wheezes, rales, or rhonchi. CV:  Regular rate and rhythm, normal heart sounds, without pathological murmurs, ectopy, gallops, or rubs. Skin:  Normal turgor. Warm, dry, without visible rash. Lymphatic: No lymphangitis or adenopathy noted. Neurological:  Oriented. Motor functions intact. Lab / Imaging Results   (All laboratory and radiology results have been personally reviewed by myself)  Labs:  No results found for this visit on 01/02/22. Imaging: All Radiology results interpreted by Radiologist unless otherwise noted. Assessment / Plan     Impression(s):  Stacey Clarke was seen today for pharyngitis, generalized body aches, sinus problem and pharyngitis. Diagnoses and all orders for this visit:    [id number]  -     COVID-19 Ambulatory; Future      Disposition:  Disposition: Discharge to home. PCR swab obtained and pending, will call with results once available. Advised to continue to self quarantine at home in the interim. If patient wishes to return to the general public at this time, she must be strictly masked for the next 2 days. Pt should also remain fever free for 24 hours and symptoms should be improved overall prior to returning.  Increase fluids and rest.  Additional symptomatic relief discussed including Tylenol prn pain/fever. Schedule virtual f/u with PCP in 7-10 days if symptoms persist. ED sooner if symptoms worsen or change. ED immediately with high or refractory fever, progressive SOB, dyspnea, CP, calf pain/swelling, shaking chills, vomiting, abdominal pain, lethargy, flank pain, or decreased urinary output. Pt verbalizes understanding and is in agreement with plan of care. All questions answered. Larina Cabot Spillan, PA-C    **This report was transcribed using voice recognition software. Every effort was made to ensure accuracy; however, inadvertent computerized transcription errors may be present.

## 2022-01-03 DIAGNOSIS — U07.1 COVID-19: ICD-10-CM

## 2022-01-04 LAB
SARS-COV-2: DETECTED
SOURCE: ABNORMAL

## 2022-01-27 ENCOUNTER — OFFICE VISIT (OUTPATIENT)
Dept: FAMILY MEDICINE CLINIC | Age: 47
End: 2022-01-27
Payer: COMMERCIAL

## 2022-01-27 VITALS
TEMPERATURE: 97.4 F | SYSTOLIC BLOOD PRESSURE: 124 MMHG | WEIGHT: 129 LBS | HEART RATE: 60 BPM | DIASTOLIC BLOOD PRESSURE: 78 MMHG | HEIGHT: 64 IN | BODY MASS INDEX: 22.02 KG/M2 | RESPIRATION RATE: 18 BRPM | OXYGEN SATURATION: 98 %

## 2022-01-27 DIAGNOSIS — R30.0 DYSURIA: ICD-10-CM

## 2022-01-27 DIAGNOSIS — R35.0 URINARY FREQUENCY: Primary | ICD-10-CM

## 2022-01-27 LAB
BILIRUBIN, POC: NORMAL
BLOOD URINE, POC: NORMAL
CLARITY, POC: CLEAR
COLOR, POC: YELLOW
GLUCOSE URINE, POC: NORMAL
KETONES, POC: NORMAL
LEUKOCYTE EST, POC: NORMAL
NITRITE, POC: NORMAL
PH, POC: 5.5
PROTEIN, POC: NORMAL
SPECIFIC GRAVITY, POC: 1.01
UROBILINOGEN, POC: 0.2

## 2022-01-27 PROCEDURE — 99213 OFFICE O/P EST LOW 20 MIN: CPT | Performed by: PHYSICIAN ASSISTANT

## 2022-01-27 PROCEDURE — 81002 URINALYSIS NONAUTO W/O SCOPE: CPT | Performed by: PHYSICIAN ASSISTANT

## 2022-01-27 NOTE — PROGRESS NOTES
22  Callum Ochoa : 1975 Sex: female  Age 42 y.o. Subjective:  Chief Complaint   Patient presents with    Dysuria         HPI:   Callum Ochoa , 42 y.o. female presents to [facility] care for evaluation of possible urinary tract infection    HPI  68-year-old female presents to [facility] for evaluation of possible urinary tract infection. The patient started noticing some increased pressure and frequency today. The patient states that it really not burning when she urinates. She cannot recall the last she had urinary tract infection. That she is not really having any significant back pain or flank pain. The patient denied fevers or chills. ROS:   Unless otherwise stated in this report the patient's positive and negative responses for review of systems for constitutional, eyes, ENT, cardiovascular, respiratory, gastrointestinal, neurological, , musculoskeletal, and integument systems and related systems to the presenting problem are either stated in the history of present illness or were not pertinent or were negative for the symptoms and/or complaints related to the presenting medical problem. Positives and pertinent negatives as per HPI. All others reviewed and are negative.       PMH:     Past Medical History:   Diagnosis Date    Fibrocystic breast     Dr. Wesley Chan [id number])     saw Dr. Renzo Ta and several ENTs--now sees chiropractor and accupuncturist    Kidney stone     x2    Murmur        Past Surgical History:   Procedure Laterality Date    BREAST SURGERY  2011    Benign--needle bx       Family History   Problem Relation Age of Onset    Thyroid Disease Mother         hypothyroidism    High Cholesterol Mother     Breast Cancer Mother 61        breast    High Blood Pressure Father     Prostate Cancer Father     Breast Cancer Maternal Aunt 39        Breast cancer       Medications:     Current Outpatient Medications:     fluticasone (FLONASE) 50 MCG/ACT nasal spray, 1 spray by Each Nostril route daily, Disp: , Rfl:     Allergies: Allergies   Allergen Reactions    Flagyl [Metronidazole]      Rapid heart rate    Tetracyclines & Related Diarrhea    Seldane [Terfenadine] Anxiety     \"Hallucinations\"    Terazol [Terconazole] Rash       Social History:     Social History     Tobacco Use    Smoking status: Former Smoker     Quit date: 10/1/2004     Years since quittin.3    Smokeless tobacco: Never Used    Tobacco comment: 1/2 pack per day x 10 years   Vaping Use    Vaping Use: Never used   Substance Use Topics    Alcohol use: Yes     Comment: red wine twice a month    Drug use: No       Patient lives at home. Physical Exam:     Vitals:    22 1635   BP: 124/78   Site: Right Upper Arm   Position: Sitting   Cuff Size: Medium Adult   Pulse: 60   Resp: 18   Temp: 97.4 °F (36.3 °C)   TempSrc: Temporal   SpO2: 98%   Weight: 129 lb (58.5 kg)   Height: 5' 4\" (1.626 m)       Exam:  Physical Exam  Nurses note and vital signs reviewed and patient is not hypoxic. General: The patient appears well and in no apparent distress. Patient is resting comfortably on cart. Skin: Warm, dry, no pallor noted. There is no rash noted. Head: Normocephalic, atraumatic. Eye: Normal conjunctiva  Ears, Nose, Mouth, and Throat: wearing a mask  Cardiovascular: Regular Rate and Rhythm  Respiratory: Patient is in no distress, no accessory muscle use, lungs are clear to auscultation, no wheezing, crackles or rhonchi  Back: Non-tender, no CVA tenderness bilaterally to percussion. GI: Normal bowel sounds, no tenderness to palpation, no masses appreciated. No rebound, guarding, or rigidity noted.   Neurological: A&O x4, normal speech        Testing:     Results for orders placed or performed in visit on 22   POCT Urinalysis no Micro   Result Value Ref Range    Color, UA Yellow     Clarity, UA Clear     Glucose, UA POC Neg     Bilirubin, UA Neg     Ketones, UA Neg     Spec Grav, UA 1.010     Blood, UA POC Trace     pH, UA 5.5     Protein, UA POC Neg     Urobilinogen, UA 0.2     Leukocytes, UA Neg     Nitrite, UA Neg            Medical Decision Making:     Vital signs reviewed    Past medical history reviewed. Allergies reviewed. Medications reviewed. Patient on arrival does not appear to be in any apparent distress or discomfort. The patient has been seen and evaluated. The patient does not appear to be toxic or lethargic. The patient's urinalysis was essentially unremarkable other than trace blood. The patient had a urine culture set up to ensure that there is no evidence of a UTI. The patient has had some increased frequency, urgency. Culture is pending and we will update the patient with the results. The patient is to return to express care or go directly to the emergency department should any of the signs or symptoms worsen. The patient is to followup with primary care physician in 2-3 days for repeat evaluation. The patient has no other questions or concerns at this time the patient will be discharged home. Clinical Impression:   Belén was seen today for dysuria. Diagnoses and all orders for this visit:    Urinary frequency    Other orders  -     POCT Urinalysis no Micro  -     Culture, Urine; Future        The patient is to call for any concerns or return if any of the signs or symptoms worsen. The patient is to follow-up with PCP in the next 2-3 days for repeat evaluation repeat assessment or go directly to the emergency department.      SIGNATURE: Suzanne Carter III, PA-C

## 2022-01-30 LAB — URINE CULTURE, ROUTINE: NORMAL

## 2022-06-03 ENCOUNTER — TELEPHONE (OUTPATIENT)
Dept: BREAST CENTER | Age: 47
End: 2022-06-03

## 2022-06-03 NOTE — TELEPHONE ENCOUNTER
Called patient to change her appointment due to provider availability. Given the option of seeing [facility]. Patient prefers to see Dr Yoanna Caraballo. Appointment given for July 19th with bilateral whole breast ultrasounds prior.     Electronically signed by Erwin Watts RN on 6/3/22 at 10:33 AM EDT

## 2022-06-17 ENCOUNTER — TELEPHONE (OUTPATIENT)
Dept: CARDIOLOGY | Age: 47
End: 2022-06-17

## 2022-06-20 ENCOUNTER — HOSPITAL ENCOUNTER (OUTPATIENT)
Dept: CARDIOLOGY | Age: 47
Discharge: HOME | End: 2022-06-20
Payer: COMMERCIAL

## 2022-06-20 LAB
LV EF: 60 %
LVEF MODALITY: NORMAL

## 2022-06-20 PROCEDURE — 93306 TTE W/DOPPLER COMPLETE: CPT | Performed by: PSYCHIATRY & NEUROLOGY

## 2022-06-24 ENCOUNTER — OFFICE VISIT (OUTPATIENT)
Dept: ORTHOPEDIC SURGERY | Age: 47
End: 2022-06-24
Payer: COMMERCIAL

## 2022-06-24 VITALS — BODY MASS INDEX: 20.49 KG/M2 | HEIGHT: 64 IN | WEIGHT: 120 LBS

## 2022-06-24 DIAGNOSIS — S29.012A STRAIN OF RHOMBOID MUSCLE, INITIAL ENCOUNTER: ICD-10-CM

## 2022-06-24 DIAGNOSIS — S46.812A STRAIN OF LEFT TRAPEZIUS MUSCLE, INITIAL ENCOUNTER: Primary | ICD-10-CM

## 2022-06-24 PROCEDURE — 99213 OFFICE O/P EST LOW 20 MIN: CPT | Performed by: PHYSICIAN ASSISTANT

## 2022-06-24 RX ORDER — METHYLPREDNISOLONE 4 MG/1
TABLET ORAL
Qty: 1 KIT | Refills: 0 | Status: SHIPPED
Start: 2022-06-24 | End: 2022-07-19 | Stop reason: ALTCHOICE

## 2022-06-24 NOTE — PROGRESS NOTES
[address] [facility],7Th Floor In Care  New Patient Note      CHIEF COMPLAINT:   Chief Complaint   Patient presents with    Arm Pain     Left arm pain radiating down her left arm. Started hurting when she was sleeping during last week. She took advil, has used lidocane patches on it. HISTORY OF PRESENT ILLNESS:                The patient is a 42 y.o. female who presents today with complaints of left scapular discomfort with some radiation of symptoms down the arm that does not go below the elbow. States this began last week while they were on vacation no known mechanism of injury. She localizes her pain to the posterior shoulder just lateral to the scapular border. She denies any numbness, tingling, loss of sensation or radicular type symptoms that go to her hand. Pain is worse with lifting overhead or lifting away from the body. She states it is been difficult to find a comfortable position to sleep in. She has tried at home therapies of ibuprofen, stretches and massage using lacrosse ball in the wall. Unfortunately, none of these home modalities have improved her symptoms. She is right-hand dominant. Past Medical History:        Diagnosis Date    Fibrocystic breast     Dr. Jesica GUNN(461.0)     saw Dr. Jeremias Landa and several ENTs--now sees chiropractor and accupuncturist    Kidney stone     x2    Murmur      Past Surgical History:        Procedure Laterality Date    BREAST SURGERY  12/2011    Benign--needle bx     Current Medications:   No current facility-administered medications for this visit. Allergies:  Flagyl [metronidazole], Tetracyclines & related, Seldane [terfenadine], and Terazol [terconazole]    Social History:   TOBACCO:   reports that she quit smoking about 17 years ago. She has never used smokeless tobacco.  ETOH:   reports current alcohol use. DRUGS:   reports no history of drug use.   OCCUPATION:  Not employed    Review of Systems   Constitutional: Negative for fever, chills, diaphoresis, appetite change and fatigue. HENT: Negative for dental issues, hearing loss and tinnitus. Negative for congestion, sinus pressure, sneezing, sore throat. Negative for headache. Eyes: Negative for visual disturbance, blurred and double vision. Negative for pain, discharge, redness and itching  Respiratory: Negative for cough, shortness of breath and wheezing. Cardiovascular: Negative for chest pain, palpitations and leg swelling. No dyspnea on exertion   Gastrointestinal:   Negative for nausea, vomiting, abdominal pain, diarrhea, constipation  or black or bloody. Hematologic\Lymphatic:  negative for bleeding, petechiae,   Genitourinary: Negative for hematuria and difficulty urinating. Musculoskeletal: Negative for neck pain and stiffness. Negative for back pain, joint swelling and gait problem. + Left shoulder pain  Skin: Negative for pallor, rash and wound. Neurological: Negative for dizziness, tremors, seizures, weakness, light-headedness, no TIA or stroke symptoms. No numbness and headaches. Psychiatric/Behavioral: Negative. Physical Examination:   General appearance: alert, well appearing, and in no distress,  normal appearing weight  Mental status: alert, oriented to person, place, and time, normal mood, behavior, speech, dress, motor activity, and thought processes  Resp:   resp easy and unlabored, no audible wheezes note  Cardiac: distal pulses palpable, skin well perfused  Neurological: alert, oriented X3, normal speech, no focal findings or movement disorder noted, motor and sensory grossly normal bilaterally, normal muscle tone  HEENT: normochephalic atraumatic, external ears and eyes normal, sclera normal, neck supple  Extremities:   peripheral pulses normal, no edema, redness or tenderness in the calves   Skin: normal coloration, no rashes or open wounds, no suspicious skin lesions noted  Psych: Affect euthymic   Musculoskeletal:   Shoulder:   On visual inspection there is no obvious deformity to the left shoulder. There is no erythema, edema, ecchymosis, or open wounds. There is no decreased sensation to light touch throughout the entire upper extremity. The patient is grossly neurovascularly intact. Left Shoulder: Patient is tender to palpation just lateral to the scapular border on the left shoulder, there is a trigger point here. There is no tenderness to palpation elsewhere in the shoulder. Active Range of motion Forward flexion 180 , Abduction 170, IR height of thoracic spine, ER 70. MMT 5/5 IR, 5/5 ER.  (-)Drop Arm, (-) Empty Can, (-) Belly Off, (+) Bear Hug, (-) O'briens, (+) Otilia Stall, (-) Neer, (-) [place]ons (-) Speeds    Spine: On visual inspection there is no obvious deformity throughout the spine. There is no erythema, edema, ecchymosis or open wounds. There is no decreased sensation to light touch throughout the spine, bilateral UE or bilateral LE. Pt is neurovascularly intact. There is no tenderness to palpation throughout the cerical spine centrally. Increased pain with neck rotation to the left and lateral tilt to the left.  (+)Alissa    Ht 5' 4\" (1.626 m)   Wt 120 lb (54.4 kg)   BMI 20.60 kg/m²      XR: None at today's visit    ASSESSMENT:   Diagnosis Orders   1. Strain of left trapezius muscle, initial encounter  methylPREDNISolone (MEDROL, JOSE ALEJANDRO,) 4 MG tablet   2. Strain of rhomboid muscle, initial encounter           PLAN:  Patient is a pleasant 44-year-old female who presents to the clinic today for evaluation of left shoulder and neck pain that began proximally 1 week ago while they are on vacation no known mechanism of injury. On exam she is tender to palpation at 1 spot that is just lateral to the scapular border at the level of trapezius and rhomboids. She does have reproducible symptoms with a bearhug test, Otilia Corona as well as Spurling's.   She has full active range of motion of her shoulder without difficulty. She does have reproducible symptoms with neck range of motion specifically rotation lateral tilt to the left. I did not obtain any x-rays in the office today, as I believe this is most likely a strain of her trapezius and/or rhomboids and is not a bony or joint issue. At this time she has failed over-the-counter medications, at home stretches and massage with a lacrosse ball. I did recommend that we start a Medrol Dosepak for symptomatic relief GI precautions. If you develop any GI upset, nausea, vomiting, change in appetite or any mood changes such as depression, anxiety or difficulty sleeping she will discontinue the medication and contact her office. I also suggested that she try Voltaren gel to the left shoulder. She can get this over-the-counter and follow the dosing card instructions. She is aware that the Voltaren gel is okay to use with the Medrol Dosepak, however she should not use any other oral anti-inflammatories while she is on the Medrol. She can use Tylenol over-the-counter if she needs to in addition to the Medrol Dosepak. I recommended heat to the area 20 minutes on 20 minutes off repeating as necessary. I also suggested the use of a TENS unit. Her  is a chiropractor and has experience with TENS units as well as stretching and massage type exercises. I did give her home exercise program for rhomboid strain that she can start over the course of the next few days she is very active in the gym. I did suggest that she just do lower body workouts for the next 7 days. If her symptoms improve she can return slowly incorporating her upper body exercises. If her symptoms or not improving or worsening over the course of the next 7 days she should call the office or return to the clinic for further evaluation. At that time I would obtain x-rays to rule out any bony processes.   Patient voiced understanding and agrees with the treatment plan outlined for her in the office today.  She has additional questions or concerns she will call our office. Otherwise, I am more than happy to see her back on an as-needed basis. Electronically signed by Mayra Coe PA-C on 6/24/22 at 9:16 AM EDT    **This report was transcribed using voice recognition software. Every effort was made to ensure accuracy; however, inadvertent computerized transcription errors may be present. **

## 2022-06-24 NOTE — PATIENT INSTRUCTIONS
Patient Education        Rhomboid Muscle Strain: Rehab Exercises  Introduction  Here are some examples of exercises for you to try. The exercises may be suggested for a condition or for rehabilitation. Start each exercise slowly. Ease off the exercises if you start to have pain. You will be told when to start these exercises and which ones will work bestfor you. How to do the exercises  Lower neck and upper back (rhomboid) stretch    1. Stretch your arms out in front of your body. Clasp one hand on top of your other hand. 2. Gently reach out so that you feel your shoulder blades stretching away from each other. 3. Gently bend your head forward. 4. Hold for 15 to 30 seconds. 5. Repeat 2 to 4 times. Resisted rows    For this exercise, you will need elastic exercise material, such as surgicaltubing or Thera-Band. 1. Put the band around a solid object, such as a bedpost, at about waist level. Stand facing where you have placed the band. Hold equal lengths of the band in each hand. 2. Start with your arms held out in front of you. 3. Pull the bands back, and move your shoulder blades together. As you finish, your elbows should be at your side and bent at 90 degrees (like the angle of the letter \"L\"). 4. Return to the starting position. 5. Repeat 8 to 12 times. Neck stretches    1. Look straight ahead, and tip your right ear to your right shoulder. Do not let your left shoulder rise as you tip your head to the right. 2. Hold for 15 to 30 seconds. 3. Tilt your head to the left. Do not let your right shoulder rise as you tip your head to the left. 4. Hold for 15 to 30 seconds. 5. Repeat 2 to 4 times to each side. Neck rotation    1. Sit in a firm chair, or stand up straight. 2. Keeping your chin level, turn your head to the right, and hold for 15 to 30 seconds. 3. Turn your head to the left, and hold for 15 to 30 seconds. 4. Repeat 2 to 4 times to each side.   Follow-up care is a key part of your treatment and safety. Be sure to make and go to all appointments, and call your doctor if you are having problems. It's also a good idea to know your test results and keep alist of the medicines you take. Where can you learn more? Go to https://sam.[employer]. org and sign in to your [employer] account. Enter N353 in the Intentio box to learn more about \"Rhomboid Muscle Strain: Rehab Exercises. \"     If you do not have an account, please click on the \"Sign Up Now\" link. Current as of: March 9, 2022               Content Version: 13.3  © 9480-5424 [employer], [employer]. Care instructions adapted under license by [facility] ([facility]). If you have questions about a medical condition or this instruction, always ask your healthcare professional. [address] any warranty or liability for your use of this information.

## 2022-06-27 ENCOUNTER — PATIENT MESSAGE (OUTPATIENT)
Dept: ORTHOPEDIC SURGERY | Age: 47
End: 2022-06-27

## 2022-06-27 DIAGNOSIS — R25.2 SPASM: ICD-10-CM

## 2022-06-27 DIAGNOSIS — S46.812A STRAIN OF LEFT TRAPEZIUS MUSCLE, INITIAL ENCOUNTER: Primary | ICD-10-CM

## 2022-06-28 RX ORDER — TIZANIDINE 4 MG/1
4 TABLET ORAL 3 TIMES DAILY PRN
Qty: 10 TABLET | Refills: 0 | Status: SHIPPED
Start: 2022-06-28 | End: 2022-07-19 | Stop reason: ALTCHOICE

## 2022-06-29 ENCOUNTER — TELEPHONE (OUTPATIENT)
Dept: ORTHOPEDIC SURGERY | Age: 47
End: 2022-06-29

## 2022-06-29 NOTE — TELEPHONE ENCOUNTER
I called pt to f/u from her visit last week as well as get status update after starting zanaflex. She states she hasn't started the zanaflex. However, her , Dr. Catherine Smith did manipulate her earlier in the week and she does have some relief. She will call if the pain increases or returns.

## 2022-07-07 ASSESSMENT — ENCOUNTER SYMPTOMS
CHOKING: 0
COUGH: 0
EYE DISCHARGE: 0
VOMITING: 0
EYE ITCHING: 0
ABDOMINAL DISTENTION: 0
CONSTIPATION: 0
WHEEZING: 0
SINUS PAIN: 0
SORE THROAT: 0
SINUS PRESSURE: 0
TROUBLE SWALLOWING: 0
DIARRHEA: 0
BACK PAIN: 0
RHINORRHEA: 0
BLOOD IN STOOL: 0
VOICE CHANGE: 0
ABDOMINAL PAIN: 0
CHEST TIGHTNESS: 0
NAUSEA: 0
SHORTNESS OF BREATH: 0

## 2022-07-07 NOTE — PROGRESS NOTES
Subjective:  Tami Abrams 30.4% Lifetime risk. Patient ID: Yennifer Fuentes is a 42 y.o. female. This note was copied forward from the last encounter. Essential components for this patient record were reviewed and verified on this visit including:  recent hospitalizations, recent imaging, PMH, PSH, FH, SOC HX, Allergies, and Medications were reviewed and updated as appropriate. In addition, the assessment and plan were copied from prior office note and updated accordingly. HPI     Yennifer Fuentes presents for 6 month follow up after breast cancer risk assessment. PCP: Nas Jin DO,  Gynecologist: Dr. Cici Mooney. Patient does routinely do self breast exams. Patient denies nipple discharge. Patient admits to previous breast biopsy. Patient denies a personal history of breast cancer. Breast cancer risk factors include family hx on mother's side. Prior right breast inframammary fold nodularity is no longer discernible on breast self-examination. Age of menarche was 17. Patient denies hormonal therapy. Patient is . Age of first live birth was 36. Patient did breast feed. Estimated body mass index is 20.6 kg/m² as calculated from the following:    Height as of 22: 5' 4\" (1.626 m). Weight as of 22: 120 lb (54.4 kg). Bra Size: 32A    Patient drinks significant caffeinated beverages. She does not smoke cigarettes. Because violence is so common, we ask all our patients: are you in a relationship or do you live with a person who threatens, hurts, or controls you:  Denies. 20: BILATERAL ULTRASOUND [facility]      ULTRASOUND FINDINGS:       In the left breast, there is no sonographic evidence of suspicious solid or cystic mass lesions. No focal areas of suspicious atypical echogenicity. Finding 1:   Sonography was performed in both breasts using a radial and anti-radial approach.  There is a stable oval well circumscribed solid mass measuring 14 x 6 x 9 mm seen in the right breast at 6 o'clock. Internal echogenicity is isoechoic. There is no axillary lymphadenopathy. No sonographic evidence of suspicious solid or cystic mass lesions. No focal areas of suspicious atypical echogenicity. The examination included imaging and evaluation of all four quadrants, the retroareolar region, and axilla of both breasts. IMPRESSION:   Stable solid mass in the right breast is benign. Screening mammogram in 6 months is recommended       Imagin19  MAMMOGRAM VIEWS:   The following mammographic views where obtained:       ULTRASOUND TECHNIQUE:   High-resolution real-time ultrasound scanning was performed. Additional elastography and doppler color flow analysis of any finding was also obtained. TOMOSYNTHESIS:   Although available and offered, the patient chose not to obtain Tomosynthesis (3 Dimensional Breast Imaging). COMPARISON:   The present examination has been compared to prior imaging studies performed at [address] [place] [address] on 2018 and 2019. CAD:   This exam was reviewed using the [employer] Computer Aided Detection (CAD)       TISSUE DENSITY:   The breasts are extremely dense (Type 4 density). MAMMOGRAM FINDINGS:   In the left breast, no suspicious masses, areas of suspicious architectural distortion, suspicious calcifications, or additional suspicious findings are identified. Finding 1:   The finding in question is not seen on mammogram.       ULTRASOUND FINDINGS:           Finding 1:   Sonography was performed in the right breast using a radial and anti-radial approach. There is an oval well circumscribed solid mass measuring 10 x 5 x 9 mm seen in the right breast at 6 o'clock located 7 centimeters from the nipple. Internal    echogenicity is hypoechoic. IMPRESSION:   Solid mass in the right breast is probably benign.    Follow-up mammogram and ultrasound in 1 year is recommended. Patient has an elevated Tyrer Sherita of 30.5%, so imaging every 6 months is indicated. Patient has deferred undergoing an MRI due to gadolinium. We are discussing MBI alternating with mammogram and ultrasound. Past Medical History:   Diagnosis Date    Fibrocystic breast     Dr. Billy Barakat    Headache(784.0)     saw Dr. Bertha Lockhart and several ENTs--now sees chiropractor and accupuncturist    Kidney stone     x2    Murmur        Past Surgical History:   Procedure Laterality Date    BREAST SURGERY  2011    Benign--needle bx     Current Outpatient Medications on File Prior to Visit   Medication Sig Dispense Refill    tiZANidine (ZANAFLEX) 4 MG tablet Take 1 tablet by mouth 3 times daily as needed (muscle spasm) 10 tablet 0    methylPREDNISolone (MEDROL, JOSE ALEJANDRO,) 4 MG tablet Take by mouth. 1 kit 0    fluticasone (FLONASE) 50 MCG/ACT nasal spray 1 spray by Each Nostril route daily       No current facility-administered medications on file prior to visit.          Allergies   Allergen Reactions    Flagyl [Metronidazole]      Rapid heart rate    Tetracyclines & Related Diarrhea    Seldane [Terfenadine] Anxiety     \"Hallucinations\"    Terazol [Terconazole] Rash       Family History   Problem Relation Age of Onset    Thyroid Disease Mother         hypothyroidism    High Cholesterol Mother     Breast Cancer Mother 61        breast    High Blood Pressure Father     Prostate Cancer Father     Breast Cancer Maternal Aunt 44        Breast cancer       Social History     Socioeconomic History    Marital status:      Spouse name: Not on file    Number of children: Not on file    Years of education: Not on file    Highest education level: Not on file   Occupational History    Not on file   Tobacco Use    Smoking status: Former Smoker     Quit date: 10/1/2004     Years since quittin.7    Smokeless tobacco: Never Used    Tobacco comment: 1/2 pack per day x 10 years   Vaping Use    Vaping Use: Never used   Substance and Sexual Activity    Alcohol use: Yes     Comment: red wine twice a month    Drug use: No    Sexual activity: Not on file   Other Topics Concern    Not on file   Social History Narrative    Not on file     Social Determinants of Health     Financial Resource Strain:     Difficulty of Paying Living Expenses: Not on file   Food Insecurity:     Worried About [address] in the Last Year: Not on file    Ran Out of Food in the Last Year: Not on file   Transportation Needs:     Lack of Transportation (Medical): Not on file    Lack of Transportation (Non-Medical): Not on file   Physical Activity:     Days of Exercise per Week: Not on file    Minutes of Exercise per Session: Not on file   Stress:     Feeling of Stress : Not on file   Social Connections:     Frequency of Communication with Friends and Family: Not on file    Frequency of Social Gatherings with Friends and Family: Not on file    Attends Oriental orthodox Services: Not on file    Active Member of Clubs or Organizations: Not on file    Attends Club or Organization Meetings: Not on file    Marital Status: Not on file   Intimate Partner Violence:     Fear of Current or Ex-Partner: Not on file    Emotionally Abused: Not on file    Physically Abused: Not on file    Sexually Abused: Not on file   Housing Stability:     Unable to Pay for Housing in the Last Year: Not on file    Number of [place] in the Last Year: Not on file    Unstable Housing in the Last Year: Not on file       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 Review of Systems   Constitutional: Negative for activity change, appetite change, chills, fatigue, fever and unexpected weight change. Chance Gomez continues to do well. Exercises regularly and maintains a healthy diet. No breast related complaints on this visit. HENT: Negative for congestion, postnasal drip, rhinorrhea, sinus pressure, sinus pain, sore throat, trouble swallowing and voice change.     Eyes: Negative for discharge, itching and visual disturbance. Respiratory: Negative for cough, choking, chest tightness, shortness of breath and wheezing. Cardiovascular: Negative for chest pain, palpitations and leg swelling. Gastrointestinal: Negative for abdominal distention, abdominal pain, blood in stool, constipation, diarrhea, nausea and vomiting. Endocrine: Negative for cold intolerance and heat intolerance. Genitourinary: Negative for difficulty urinating, dysuria, frequency and hematuria. Musculoskeletal: Negative for arthralgias, back pain, gait problem, joint swelling, myalgias, neck pain and neck stiffness. Allergic/Immunologic: Negative for environmental allergies and food allergies. Neurological: Negative for dizziness, seizures, syncope, speech difficulty, weakness, light-headedness and headaches. Hematological: Negative for adenopathy. Does not bruise/bleed easily. Psychiatric/Behavioral: Negative for agitation, confusion and decreased concentration. The patient is not nervous/anxious. Objective:   Physical Exam  Vitals and nursing note reviewed. Constitutional:       General: She is not in acute distress. Appearance: She is well-developed. She is not diaphoretic. Comments: Pleasant and cooperative. HENT:      Head: Normocephalic and atraumatic. Mouth/Throat:      Pharynx: No oropharyngeal exudate. Eyes:      General: No scleral icterus. Right eye: No discharge. Left eye: No discharge. Conjunctiva/sclera: Conjunctivae normal.   Neck:      Thyroid: No thyromegaly. Vascular: No JVD. Trachea: No tracheal deviation. Cardiovascular:      Rate and Rhythm: Normal rate and regular rhythm. Heart sounds: Murmur (Faint) heard. No friction rub. No gallop. Pulmonary:      Effort: Pulmonary effort is normal. No respiratory distress or retractions. Breath sounds: Normal breath sounds. No stridor. No wheezing or rales. Chest:      Chest wall: No mass, lacerations, deformity, swelling, tenderness or edema. Breasts:     Breasts are symmetrical.      Right: No inverted nipple, mass, nipple discharge, skin change or tenderness. Left: No inverted nipple, mass, nipple discharge, skin change or tenderness. Comments: Overall, her clinical exam remains stable and without evidence of malignancy. Right inframammary fold nodule no longer palpable. Abdominal:      General: There is no distension. Palpations: Abdomen is soft. Tenderness: There is no abdominal tenderness. There is no guarding or rebound. Musculoskeletal:         General: No tenderness or deformity. Normal range of motion. Right shoulder: Normal.      Left shoulder: Normal.      Cervical back: Normal range of motion and neck supple. Lymphadenopathy:      Cervical: No cervical adenopathy. Right cervical: No superficial, deep or posterior cervical adenopathy. Left cervical: No superficial, deep or posterior cervical adenopathy. Upper Body:      Right upper body: No pectoral adenopathy. Left upper body: No pectoral adenopathy. Skin:     General: Skin is warm and dry. Coloration: Skin is not pale. Findings: No erythema or rash. Neurological:      Mental Status: She is alert and oriented to person, place, and time. Coordination: Coordination normal.   Psychiatric:         Behavior: Behavior normal.         Thought Content: Thought content normal.         Judgment: Judgment normal.       Assessment:     42 y.o. extremely pleasant female with increased risk for development of breast cancer who presents today for 6 month follow up. Her risks for breast cancer include gender,  Mother and Maternal Aunt (maternal aunt  age 39 due to breast cancer). Her mother had Genetic testing which was negative.   She has opted for alternating breast screening ultrasound and screening mammogram, every 6 months.    -05/18/20: BILATERAL ULTRASOUND:[facility]    ULTRASOUND FINDINGS:       In the left breast, there is no sonographic evidence of suspicious solid or cystic mass lesions. No focal areas of suspicious atypical echogenicity. Finding 1:   Sonography was performed in both breasts using a radial and anti-radial approach. There is a stable oval well circumscribed solid mass measuring 14 x 6 x 9 mm seen in the right breast at 6 o'clock. Internal echogenicity is isoechoic. There is no axillary lymphadenopathy. No sonographic evidence of suspicious solid or cystic mass lesions. No focal areas of suspicious atypical echogenicity. The examination included imaging and evaluation of all four quadrants, the retroareolar region, and axilla of both breasts. IMPRESSION:   Stable solid mass in the right breast is benign. Screening mammogram in 6 months is recommended     -12/8/2020: BILATERAL DIAGNOSTIC MAMMOGRAM:  Negative, BI-RADS 1.  -05/18/2021: RIGHT AND LEFT BREAST COMPLETE ULTRASOUND:[facility]:  Benign, BI-RADS 2.  -TC:  [facility] Risk Evaluation was reviewed. Pt has a 30.4 % lifetime risk (to age 82) of developing breast cancer.    -Chemoprevention:  While she would be a candidate for chemoprevention with Tamoxifen, she is adamantly opposed to.    -12/09/2021 B/L screening mammogram:  Negative, BI-RADS 1.  -12/09/2021 clinical follow up her exam remains stable and without clinically suspicious findings. Imaging reviewed, including BI-RADS result. She continues healthy diet and exercise, continues BSE. She is declining chemoprevention and prefers high risk screening with B/L screening mammogram and B/L whole breast US, alternating every six months.    -7/19/2022: BILATERAL WHOLE BREAST ULTRASOUND: [facility]  FINDINGS:   High-resolution real-time ultrasound scanning of both breasts was performed.    Examination included imaging and evaluation of all 4 quadrants, the   retroareolar region, and axilla of both breasts. There are scattered   bilateral simple and complicated cysts of varying size. No suspicious cystic   or solid mass identified. There is no axillary lymphadenopathy. Impression   Benign findings with no sonographic evidence of malignancy in either breast.       RECOMMENDATION:       Annual bilateral mammogram and breast MRI are advised. BIRADS:   BIRADS - CATEGORY 2       Benign Findings. OVERALL ASSESSMENT - BENIGN     All images of ultrasound reviewed with patient. Benign findings bilaterally. High risk active surveillance we will continue with digital mammogram and clinical breast examination in 6 months. Patient will continue breast self exams monthly. Questions answered to patient's satisfaction. Plan:    Continue monthly breast self examination; detailed instructions reviewed today. Bring any changes to your physician's attention. Continue healthy diet and exercise routinely as tolerated to maintain IBW. Avoid alcohol. Limit caffeine intake. Repeat mammogram December 10 (or after) 2022. Repeat bilateral breast US in June 2022. Continue follow up with Primary Care and Gynecology. RTC December 2022 wit B/L digital mammography same day. During today's visit, face-to-face time 25 minutes, greater than 50% in counseling education and coordination of care. All questions were answered to her apparent satisfaction, and she is agreeable to the plan as outlined above. Dr Flo Vargas.  Chen Welsh MD FACS

## 2022-07-19 ENCOUNTER — OFFICE VISIT (OUTPATIENT)
Dept: BREAST CENTER | Age: 47
End: 2022-07-19
Payer: COMMERCIAL

## 2022-07-19 ENCOUNTER — HOSPITAL ENCOUNTER (OUTPATIENT)
Dept: GENERAL RADIOLOGY | Age: 47
Discharge: HOME | End: 2022-07-21
Payer: COMMERCIAL

## 2022-07-19 VITALS
HEART RATE: 61 BPM | RESPIRATION RATE: 12 BRPM | BODY MASS INDEX: 21.51 KG/M2 | DIASTOLIC BLOOD PRESSURE: 74 MMHG | SYSTOLIC BLOOD PRESSURE: 119 MMHG | OXYGEN SATURATION: 100 % | TEMPERATURE: 99.5 F | HEIGHT: 64 IN | WEIGHT: 126 LBS

## 2022-07-19 DIAGNOSIS — Z91.89 AT HIGH RISK FOR BREAST CANCER: Primary | ICD-10-CM

## 2022-07-19 DIAGNOSIS — R92.2 DENSE BREAST TISSUE ON MAMMOGRAM: ICD-10-CM

## 2022-07-19 DIAGNOSIS — Z12.31 VISIT FOR SCREENING MAMMOGRAM: Primary | ICD-10-CM

## 2022-07-19 DIAGNOSIS — Z12.31 BREAST CANCER SCREENING BY MAMMOGRAM: ICD-10-CM

## 2022-07-19 PROCEDURE — 99213 OFFICE O/P EST LOW 20 MIN: CPT | Performed by: SURGERY

## 2022-07-19 PROCEDURE — 76641 ULTRASOUND BREAST COMPLETE: CPT

## 2022-08-02 DIAGNOSIS — J32.9 FREQUENT EPISODES OF SINUSITIS: Primary | ICD-10-CM

## 2022-08-16 ENCOUNTER — HOSPITAL ENCOUNTER (OUTPATIENT)
Dept: CT IMAGING | Age: 47
Discharge: HOME | End: 2022-08-18
Payer: COMMERCIAL

## 2022-08-16 DIAGNOSIS — J32.9 FREQUENT EPISODES OF SINUSITIS: ICD-10-CM

## 2022-08-16 PROCEDURE — 70486 CT MAXILLOFACIAL W/O DYE: CPT

## 2022-08-16 NOTE — PROGRESS NOTES
Dear Dr Pam Perez, DO     We had the pleasure of seeing Jose Eduardo Sparsk, 1975 here    on 8/18/2022  Please see below for review of care and plans. Chief complaint-   No diagnosis found. History of Present Illness- pt with 3 mo hx of worsening sensation in mouth with uri allergy sxs that induces cough and also a recurring sloughing of upper palate skin that is a nuisance. No bleeding. Nothing helps it. + exercises. Decent water intake. + caffeine. 3/2/21 pt oral issues have improved but now has issues with right lacrimal system. blockage improved with irrigatin of ducts but has been happening more often as she gets older. Here for eval and tx options. Did use irrigation and nose and eye drops recently and it did help. 8/16/22: Pt here for clogged tear duct and sinus issues. Had CT sinus 8/16/22. Not currently having issues. Tried using nasal irrigation w/steroid, caused unstable mood and irritability. No pain now but + when duct is blocked, just pressure Rt side sinus'. No difficulty swallowing. No change in voice. Drinks 7-8 glasses of water, 1-2 cups of coffee, and no alcohol. Weight is stable. Review of Systems- No drainage, discharge, or headache. Complete 10 system ROS completed and negative except as noted above. Physical Examination-   Vital Signs-There were no vitals taken for this visit. Ears- Tympanic membranes clear bilaterally. No middle ear effusion. No pre or post auricular tenderness. Nose- Nasal mucosa clear and very dry.  + significant septal deviation or inferior turbinate hypertrophy. Narrow nasal vault  Oral Cavity/Oropharynx- Floor of mouth and tongue are soft and nontender. + 2mm lesion on left base of uvula, virile or polyp like- still present. Area of white hyperkeratosis of the midline upper palate, ~3-4 mm oval- healed with no issues. . No posterior pharyngeal erythema. + gag reflex  Neck- Soft and nontender.   No masses, lesions, lymphadenopathy, or thyroid nodules appreciated. Cranial Nerve- Cranial nerves II to XII intact. Extraocular muscles intact. No gross motor visual deficits. No spontaneous nystagmus. Face- No facial skin tenderness to palpation. Heart- No cyanosis, regular  Lungs- No stridor, no intercostal accessory muscle use  General- The patient is in no acute distress. A&O x3    Scope previous  Procedure note- after pt verbally consented, was sprayed nasally with 1:1 neosynephrine and xylocaine. Scope was passed and found nasal cavity with no lesion, no polyps, no blickage or mas under ri inf turb. np clear, tonsil wnl, tongue mobile and no masses, vocal cords mobile bilaterally with full ab and ad duction. Hypopharynx clear, open and no masses. Medical Decision Making and Treatment Plan. 1. Increase hydration  2. Nasal irrigation  3. Ketotifen eye drops- cintinue  4. Steroid nasal spray- stop. Will start steroid impregnated irrigations. 5. dw pt option of tx of masses. May go away on own or can remove surgically. Not worried about cancer at this point. If they persist with no improvement or worsen, pt will fu and further discuss surgery as pt would like to wait for now. Reasonable and pt understood to call if changes. - no intervention neeeded now. 6. Daily irrigations but can use ketotifen and increase irrigations when eye sxs flare up. Can add afrin and mucolytic when feeling sxs   7. If no better dw pt options of surgery intranasally to open up duct and place stents. Not needed now but can do if worsens. - she understood      I spent 32 minutes with the patients care and >50% of this time on counseling or coordinating care    Thank you for the opportunity to take part in the care of this very pleasant patient, Maria Antonia Chang  Sincerely,            Shilo Kerns.  Leopold Penner, M.D., Ph.D., [facility]  Department of Otolaryngology-Head and Neck Surgery

## 2022-08-18 ENCOUNTER — OFFICE VISIT (OUTPATIENT)
Dept: ENT CLINIC | Age: 47
End: 2022-08-18
Payer: COMMERCIAL

## 2022-08-18 VITALS — BODY MASS INDEX: 21.58 KG/M2 | HEIGHT: 64 IN | WEIGHT: 126.4 LBS

## 2022-08-18 DIAGNOSIS — H04.551 OBSTRUCTION OF RIGHT TEAR DUCT: Primary | ICD-10-CM

## 2022-08-18 DIAGNOSIS — R51.9 FACE PAIN: ICD-10-CM

## 2022-08-18 DIAGNOSIS — J34.9 SINUS DISEASE: ICD-10-CM

## 2022-08-18 PROCEDURE — 99214 OFFICE O/P EST MOD 30 MIN: CPT | Performed by: OTOLARYNGOLOGY

## 2022-08-24 DIAGNOSIS — R25.2 SPASM: ICD-10-CM

## 2022-08-24 DIAGNOSIS — S29.012A STRAIN OF RHOMBOID MUSCLE, INITIAL ENCOUNTER: ICD-10-CM

## 2022-08-24 DIAGNOSIS — S46.812A STRAIN OF LEFT TRAPEZIUS MUSCLE, INITIAL ENCOUNTER: Primary | ICD-10-CM

## 2022-09-09 ENCOUNTER — HOSPITAL ENCOUNTER (OUTPATIENT)
Dept: PHYSICAL THERAPY | Age: 47
Setting detail: THERAPIES SERIES
Discharge: HOME | End: 2022-09-09
Payer: COMMERCIAL

## 2022-09-09 PROCEDURE — 97162 PT EVAL MOD COMPLEX 30 MIN: CPT

## 2022-09-09 NOTE — PROGRESS NOTES
[address] [facility]                Phone: [phone]   Fax: [phone]    Physical Therapy Daily Treatment Note  Date:  2022    Patient Name:  Crystal Lee    :  1975  MRN: [id number]    Referring Physician:  Marsha Montero PA-C  Insurance Information:  [address] [place] [address]      Evaluation date:  2022  Diagnosis:  Strain of L trapezius muscle; Spasm; Strain of rhomboid muscle  ICD-10 Codes:  H30.946D; R25.2; S29.012A  Evaluating Physical Therapist:  Senait Vaughn PT DPT        Visit:       [facility] RE-ASSESSMENT FORM      Check of Management Strategies:     Compliance / Commitment  [] Excellent   [] Good   [] Fair   [] Poor    Posture Correction: []Yes   [] No    Performing Exercises:  []Yes   [] No    Frequency: [] Appropriate [] Not appropriate                        Symptom Response during  exercises:  [] Increase   [] Decrease   [] No  effect     Technique: [] Good  [] Needs correcting    Comments:        Symptomatic Presentation:    Pain Location: [] Centralised     [] Same    [] Peripheralized               Description:      Frequency: []Better    []Same   []Worse    Severity: /10         Functional Status:  % improvement since initial assessment:  %    Exercises:     Exercise  During After  Comments    Seated:    Ret    Ret/ext    Ret/ext/rot                    Supine:    Ret    Ret/ext    Ret/ext/rot                    Sustained extension                    **L UE strengthening PRN when able**                                         Mechanical Presentation:    Sitting Posture: []Good   []Fair  []Poor                  Standing Posture:  []Good   []Fair  []Poor      Deformity: [] Yes    [] No   [] Not applicable                  Neurological Testing:  [] Better    [] Same   [] Worse    [] NA     Movement Loss: [] Better    [] Same   [] Worse      Repeated Movements:   [] Better    [] Same   [] Worse          SUMMARY: [] Better

## 2022-09-09 NOTE — PROGRESS NOTES
[address] [facility]                Phone: [phone]   Fax: [phone]    Physical Therapy Initial Evaluation  Date:  2022    Patient Name:  Quentin Cotton    :  1975  MRN: [id number]    Referring Physician:  Dany Thompson PA-C  Insurance Information:  [employer]     Evaluation date:  2022  Diagnosis:  Strain of L trapezius muscle; Spasm; Strain of rhomboid muscle  ICD-10 Codes:  P98.631D; R25.2; S29.012A  Evaluating Physical Therapist:  Gab Chilel, PT, DPT      [place] [address] Cervical Spine Assessment    Leisure:  Mechanical stresses: Lifting/working out (Pt stopped lifting for about 2 weeks at the end of May and then stopped lifting again for the entire month of August due to pain.)  Functional disability from present episode: lifting weights  VAS Score (0-10): 0/10 currently; goes up to 7/10    HISTORY  Present symptoms: L UE weakness; aching from L spine of scapula to medial scapular border and down L UE to her elbow  Present since: 2022      [] improving  [x]unchanging  []worsening  Commenced as a result of:  Pt stated she noticed it when she was lifting weights, specifically with pushing and OH exercises (chest press, push-ups, shoulder press).   Symptoms at onset: [] neck  [x] Arm (L)  [] forearm [] headache   Constant symptoms: [] neck  [] arm  [] forearm [] headache  Intermittent symptoms: [x] neck [x] Arm and L scapula  [] forearm [] headache    Worse: [] bending  [] sitting [x] Turning (L)  [] lying/rising   [] am  [] as the day progresses [] pm  [] when still  [] on the move   [] other:     Better: [] bending  [x] sitting [] turning [] lying/rising   [] am  [] as the day progresses [] pm  [] when still  [x] on the move    [] other:     Disturbed sleep: yes    Pillows:  3    Sleeping posture: side lying L      Previous episodes: 0   Year of first episode:   Previous history: Per pt, she was diagnosed with occipital neuralgia with associated migraines and neck pain in 2007. Pt sees an acupuncturist 1x/month for the occipital neuralgia. Pt goes to [employer] to exercise (running, rowing, and lifting weights). Pt has a son age 12 and a daughter age 15; pt also has cats at home that she picks up (each weighs between 15-20#). Previous treatments: Pt's  is a chiropractor and has been providing manipulations and cupping for pt; per pt, the cupping provided some relief. Pt also performed soft tissue mobilizations to her L upper back area with a lacrosse ball against the wall. SPECIFIC QUESTIONS  dizziness/tinnitus/nausea/swallowing/+ve/-ve - negative  Gait: normal  Medications: Medrol dose pack in July (pt state it didn't help); muscle relaxor (pt stated she only took 1/2 a pill 1x because she didn't like how it made her feel); NSAIDS PRN  General health: excellent   Imaging: no       Recent or major surgery: no   Night pain: no  Accidents: no     Unexplained weight loss: no  Other: NA      EXAMINATION    POSTURE  Sitting: fair  Standing: fair   Protruded head: mild      Wry neck: nil   Relevant: NA  Correction of posture: no effect  Other observations: NA    NEUROLOGICAL  Motor deficit: R UE 5/5. L UE 4+/5 except triceps 3+/5. Sensory deficit: Pt stated she previously had intermittent numbness in L UE to her elbow but denies any episodes of numbness/tingling recently. Reflexes: NT  Dural signs: NT    MOVEMENT LOSS   Devan Mod Min Nil Pain   Protrusion   x  No effect   Flexion   x  Increases    Retraction   x  Increases    Extension   x  Increases    Lateral flexion R   x  Increases    Lateral flexion L   x  Increases    Rotation R    x No effect   Rotation L   x  Increases        TEST MOVEMENTS  (describe effects on present pain; During - produces, abolishes, increases, decreases, no effect, centralizing, peripheralizing;  After - better, worse, no better, no worse, no effect, centralized, peripheralized) Symptoms during testing Symptoms after testing Increased ROM Decreased ROM No effect   Pretest symptoms in sitting         PRO         Rep PRO         RET         Rep RET 1x10 NE NE      RET EXT         Rep RET EXT 2x10    With self-OP 2x10 D, D    P, D B, B    NW, B 1/10 L side of neck x     Pretest symptoms in lying         RET         Rep RET         RET EXT         Rep RET EXT         If required pretest pain sitting         LF - R         Rep LF - R         LF - L         Rep LF - L         ROT - R         Rep ROT - R         ROT - L         Rep ROT - L         FLEX         Rep FLEX               STATIC TESTS    Protrusion - NT    Retraction - NT    Flexion - NT  Extension - (sitting/prone/supine) NT      OTHER TESTS  NT      PROVISIONAL CLASSIFICATION    Derangement       PRINCIPLE OF MANAGEMENT    Education - posture/use of lumbar roll, avoid cervical spine flexion and protrusion  HEP - seated ret/ext with self-OP 3x10 every 2-3 hours  Mechanical therapy - yes  Extension principle vs. Lateral principle     Pt will be seen for 1-2 visits per week for 6-8 weeks for a total of 8-12 visits to accomplish goals set below:        Short/Long Term Goals: (6-8 weeks)      1. Pt will report centralized L UE radicular symptoms. 2.  Pt will report abolished (0/10) cervical spine pain with activity. 3.  Pt will increase cervical spine AROM to [facility] throughout. 4.  Pt will improve posture to good. 5.  Pt will increase L UE strength to 5/5 throughout. 6.  Pt will be independent with HEP. Pt's potential for reaching Physical Therapy goals: good.     CPT codes 9/9/2022 Units  Minutes   Low Complexity PT evaluation  95952     Moderate Complexity PT evaluation  57431 1    High Complexity PT evaluation [id number]     PT Re-evaluation  [id number]     Gait training [id number]     Manual therapy  [phone]     Therapeutic activities  38376     Therapeutic exercises  [phone]     Neuromuscular reeducation  [id number]           Time In: 1000  Time Out:  [address] [place], [place]   [id number]

## 2022-09-12 ENCOUNTER — HOSPITAL ENCOUNTER (OUTPATIENT)
Dept: PHYSICAL THERAPY | Age: 47
Setting detail: THERAPIES SERIES
Discharge: HOME | End: 2022-09-12
Payer: COMMERCIAL

## 2022-09-12 PROCEDURE — 97140 MANUAL THERAPY 1/> REGIONS: CPT

## 2022-09-12 PROCEDURE — 97110 THERAPEUTIC EXERCISES: CPT

## 2022-09-12 PROCEDURE — 97530 THERAPEUTIC ACTIVITIES: CPT

## 2022-09-12 NOTE — PROGRESS NOTES
[address] [facility]                Phone: [phone]   Fax: [phone]    Physical Therapy Daily Treatment Note  Date:  2022    Patient Name:  Favio Verde    :  1975  MRN: [id number]    Referring Physician:  Janie Ruiz PA-C  Insurance Information:  [facility]      Evaluation date:  2022  Diagnosis:  Strain of L trapezius muscle; Spasm; Strain of rhomboid muscle  ICD-10 Codes:  L27.445J; R25.2; S29.012A  Evaluating Physical Therapist:  Silvia Cross, PT, DPT        Visit:       [address] RE-ASSESSMENT FORM      Check of Management Strategies:     Compliance / Commitment  [] Excellent   [x] Good   [] Fair   [] Poor    Posture Correction: [x]Yes   [] No    Performing Exercises:  [x]Yes   [] No    Frequency: [] Appropriate [] Not appropriate                        Symptom Response during  exercises:  [] Increase   [x] Decrease   [] No  effect     Technique: [] Good  [] Needs correcting    Comments:  Pt stated that by time she got home from her evaluation Friday, her ears were \"humming\" and later that evening, she was having some dizziness and nausea. Pt stated she felt as though her occipital neuralgia symptoms were flared up. Her  performed cervical spine traction and she stated that helped. Since then, pt has only been doing cervical spine retraction 1x10 every 3 hours. Symptomatic Presentation:    Pain Location: [] Centralised     [x] Same    [] Peripheralized               Description:  L trapezius    Frequency: []Better    []Same   []Worse    Severity: 1-2/10 at rest; 3-4/10 with movement/activity         Functional Status:  % improvement since initial assessment:  %    Exercises:     Exercise  During After  Comments    Seated:    Ret    Ret/ext    Ret/ext/rot with towel 4x                  Pt reported significant dizziness with seated exercises.               Supine:    Ret 1x10 with PT mobilization and cervical spine traction     Ret/ext 2x10 with PT mobilization and cervical spine traction      Ret/ext/rot 1x10 with PT-OP and cervical spine traction  1x6 with towel              P          P    NE             NW/B          NW/B    B             Pain produced down to L hand and into L scapula. Wall push-ups - several reps to assess L UE pain    Chair push-ups - 2 sets, several reps each to assess L UE pain                 Sustained extension                    **L UE strengthening PRN when able**                                         9/12/2022:  L rotation and L side bending - minimal movement loss but better when compared to initial evaluation; both directions increase L trapezius and neck pain but per pt, not as bad as during initial evaluation  R rotation and R side bending - WFL; both directions increase L trapezius and cervical spine pain but again, pt reported it was not as bad as it was during initial evaluation    Mechanical Presentation:    Sitting Posture: []Good   [x]Fair  []Poor                  Standing Posture:  [x]Good   [x]Fair  []Poor      Deformity: [] Yes    [] No   [x] Not applicable                  Neurological Testing:  [] Better    [] Same   [] Worse    [x] NA     Movement Loss: [x] Better    [] Same   [] Worse      Repeated Movements:   [x] Better    [] Same   [] Worse          SUMMARY: [x] Better    [] Same   [] Worse                    Classification Confirmed   [x]  Yes     [] No    Comments:  Due to pt's difficulty with seated exercises (increased occipital neuralgia symptoms), attempted supine exercises this morning. Pt denied any dizziness, lightheadedness, or ringing in her ears with supine exercises. Pt reported 1 episode of pain shooting down her L hand and into her L scapula with supine exercises but reported it was abolished with rest.  Pt given verbal and tactile cues for use of towel when doing supine exercises on her own.   Pt demonstrated good form and pacing with supine exercises. Pt attempted seated exercises with towel however, after only 4 repetitions, pt reported significant dizziness and had to stop seated exercises. After sitting for several minutes, pt reported the dizziness was gone. Pt performed wall push-ups and/or chair push-ups in between exercise sets in order to reassess L UE pain with activity. By end of session, pt reported significant improvement in L UE symptoms with activity. Pt also reported pain at rest in L trapezius and cervical spine was 0/10 by end of session. Pt instructed in HEP and verbalized understanding. Revised Classification (if appropriate):    [x] Derangement   [] Dysfunction   [] Posture           [] OTHER (subgroup)    Management Today:   [x] Posture      [x] HEP instruction     [x] Exercise  9/9/2022 - posture/use of lumbar roll, avoid cervical spine flexion and protrusion, seated ret/ext with self-OP 3x10 every 2-3 hours  9/12/2022 - supine ret/ext/rot with towel 10-15x every 4-5 hours as able, seated ret/ext/rot with towel begin incorporating as able depending on symptoms. Pt instructed to increase reps and frequency of supine exercises as able over the next couple of days depending on her occipital neuralgia symptoms. Pt verbalized understanding. Plan for next session:  Reassess pt's response to HEP and progress with extension vs lateral principle as indicated.         Barriers to Recovery:  Occipital neuralgia        CPT codes 9/12/2022 Units  Minutes   Low Complexity PT evaluation  31222     Moderate Complexity PT evaluation  05562     High Complexity PT evaluation 81270     PT Re-evaluation  [id number]     Gait training 47410     Manual therapy  86977 2    Therapeutic activities  20663 1    Therapeutic exercises  47962 1    Neuromuscular reeducation  08270 Time In:  0901  Time Out:  Elise , [place], [place]   [id number]

## 2022-09-16 ENCOUNTER — HOSPITAL ENCOUNTER (OUTPATIENT)
Dept: PHYSICAL THERAPY | Age: 47
Setting detail: THERAPIES SERIES
Discharge: HOME | End: 2022-09-16
Payer: COMMERCIAL

## 2022-09-16 NOTE — PROGRESS NOTES
[address] [facility]                Phone: [phone]  Fax: [phone]    Physical Therapy  Cancellation/No-show Note  Patient Name:  Jack Patel  :  1975   Date:  2022    For today's appointment patient:  [x]  Cancelled  []  Rescheduled appointment  []  No-show     Reason given by patient:  []  Patient ill  []  Conflicting appointment  []  No transportation    []  Conflict with work  [x]  No reason given  []  Other:     Comments:  Per  staff, pt cancelled her appointment this morning. Pt's next scheduled visit is 2022 at 0900.     Electronically signed by:  Junior Court PT, DPT

## 2022-09-19 ENCOUNTER — HOSPITAL ENCOUNTER (OUTPATIENT)
Dept: PHYSICAL THERAPY | Age: 47
Setting detail: THERAPIES SERIES
Discharge: HOME | End: 2022-09-19
Payer: COMMERCIAL

## 2022-09-19 PROCEDURE — 97110 THERAPEUTIC EXERCISES: CPT

## 2022-09-19 PROCEDURE — 97530 THERAPEUTIC ACTIVITIES: CPT

## 2022-09-19 PROCEDURE — 97140 MANUAL THERAPY 1/> REGIONS: CPT

## 2022-09-19 NOTE — PROGRESS NOTES
[address] [facility]                Phone: [phone]   Fax: [phone]    Physical Therapy Daily Treatment Note  Date:  2022    Patient Name:  Brandon Quigley    :  1975  MRN: [id number]    Referring Physician:  Francisca Reis PA-C  Insurance Information:  [address] [place] [address]      Evaluation date:  2022  Diagnosis:  Strain of L trapezius muscle; Spasm; Strain of rhomboid muscle  ICD-10 Codes:  X02.496H; R25.2; S29.012A  Evaluating Physical Therapist:  Marichuy Bassett PT, DPT        Visit: 3/8-12      [address] RE-ASSESSMENT FORM      Check of Management Strategies:     Compliance / Commitment  [] Excellent   [x] Good   [] Fair   [] Poor    Posture Correction: [x]Yes   [] No    Performing Exercises:  [x]Yes   [] No    Frequency: [x] Appropriate [] Not appropriate                        Symptom Response during  exercises:  [] Increase   [x] Decrease   [] No  effect     Technique: [] Good  [] Needs correcting    Comments:  Pt stated she has been working on supine exercises and can tolerate those better than seated exercises. Pt started slow in order to monitor her occipital neuralgia symptoms but was able to increase reps throughout the week last week. Pt went to the gym 4 days last week; pt lifted lighter weights than normal.  Pt stated she could feel the strain on the L side of her neck and into her upper arm. Pt denies the shooting pain and numbness she was having and also stated she is able to sleep on her L side.           Symptomatic Presentation:    Pain Location: [] Centralised     [x] Same    [] Peripheralized               Description:  L upper arm/middle deltoid with movement    Frequency: []Better    []Same   []Worse    Severity: 0/10 at rest; 3/10 with movement/activity (specifically activities at the gym and a swatting motion with L UE)         Functional Status:  % improvement since initial assessment:  %    Exercises:     Exercise During After  Comments   NT Seated:    Ret    Ret/ext    Ret/ext/rot with towel 4x                  Pt reported significant dizziness with seated exercises. Supine:        Ret/ext/rot 5x10 with towel    Ret/L rot with PT-OP 2x10    Ret/L lateral flex with PT-OP 2x10  With self-OP 1x10              P              P    P, NE                 NW/B              B    NW, NE             Pain produced down to L hand and into L scapula. Chair push-ups - 2 sets, several reps each to assess L UE pain    Swatting with L UE - multiple times throughout session to reassess L UE pain                 Sustained extension                    **L UE strengthening PRN when able**                                         9/12/2022:  L rotation and L side bending - minimal movement loss but better when compared to initial evaluation; both directions increase L trapezius and neck pain but per pt, not as bad as during initial evaluation  R rotation and R side bending - WFL; both directions increase L trapezius and cervical spine pain but again, pt reported it was not as bad as it was during initial evaluation    9/19/2022:  Rotation R/L WFL, minimal pain with L rotation    Mechanical Presentation:    Sitting Posture: []Good   [x]Fair  []Poor                  Standing Posture:  [x]Good   [x]Fair  []Poor      Deformity: [] Yes    [] No   [x] Not applicable                  Neurological Testing:  [] Better    [] Same   [] Worse    [x] NA     Movement Loss: [x] Better    [] Same   [] Worse      Repeated Movements:   [x] Better    [] Same   [] Worse          SUMMARY: [x] Better    [] Same   [] Worse                    Classification Confirmed   [x]  Yes     [] No    Comments:  Continued with supine exercises due to pt's occipital neuralgia and difficulty tolerating seated exercises. Pt continues to tolerate supine exercises well and denied any episodes of dizziness or lightheadedness.   Following addition of ret/L rot and ret/L lateral flex, pt reported decreased pain into L UE with swatting motion and chair push-ups. Pt reported more noticeable change in symptoms following ret/L lateral flex. Pt instructed in new HEP and verbalized understanding. Revised Classification (if appropriate):    [x] Derangement   [] Dysfunction   [] Posture           [] OTHER (subgroup)    Management Today:   [x] Posture      [x] HEP instruction     [x] Exercise  9/9/2022 - posture/use of lumbar roll, avoid cervical spine flexion and protrusion, seated ret/ext with self-OP 3x10 every 2-3 hours  9/12/2022 - supine ret/ext/rot with towel 10-15x every 4-5 hours as able, seated ret/ext/rot with towel begin incorporating as able depending on symptoms. Pt instructed to increase reps and frequency of supine exercises as able over the next couple of days depending on her occipital neuralgia symptoms. Pt verbalized understanding. 9/19/2022 - supine ret/L lateral flex with self-OP 2x10 followed by supine ret/ext/rot with towel 1x10 every 3-4 hours. Continue to monitor occipital neuralgia symptoms. Plan for next session:  Reassess pt's response to HEP and progress with extension vs lateral principle as indicated.         Barriers to Recovery:  Occipital neuralgia        CPT codes 9/19/2022 Units  Minutes   Low Complexity PT evaluation  62276     Moderate Complexity PT evaluation  99789     High Complexity PT evaluation [id number]     PT Re-evaluation  [id number]     Gait training [id number]     Manual therapy  39529 1    Therapeutic activities  36338 1    Therapeutic exercises  38601 1    Neuromuscular reeducation  [address]                                                                                                                                                                       Time In:  1268  Time Out:  Rylee 96, PT, DPT   [id number]

## 2022-09-23 ENCOUNTER — HOSPITAL ENCOUNTER (OUTPATIENT)
Dept: PHYSICAL THERAPY | Age: 47
Setting detail: THERAPIES SERIES
Discharge: HOME | End: 2022-09-23
Payer: COMMERCIAL

## 2022-09-23 PROCEDURE — 97110 THERAPEUTIC EXERCISES: CPT

## 2022-09-23 PROCEDURE — 97530 THERAPEUTIC ACTIVITIES: CPT

## 2022-09-23 NOTE — PROGRESS NOTES
[address] [facility]                Phone: [phone]   Fax: [phone]    Physical Therapy Daily Treatment Note  Date:  2022    Patient Name:  Jeremiah Ventura    :  1975  MRN: [id number]    Referring Physician:  Aleksandra Vieira PA-C  Insurance Information:  [facility]      Evaluation date:  2022  Diagnosis:  Strain of L trapezius muscle; Spasm; Strain of rhomboid muscle  ICD-10 Codes:  R66.569N; R25.2; S29.012A  Evaluating Physical Therapist:  Wolf Irvin, PT, DPT        Visit:       [address] RE-ASSESSMENT FORM      Check of Management Strategies:     Compliance / Commitment  [] Excellent   [x] Good   [] Fair   [] Poor    Posture Correction: [x]Yes   [] No    Performing Exercises:  [x]Yes   [] No    Frequency: [x] Appropriate [] Not appropriate                        Symptom Response during  exercises:  [] Increase   [x] Decrease   [] No  effect     Technique: [] Good  [] Needs correcting    Comments:  Pt stated she worked on ret/L rot and ret/L lateral flex plus ret/ext/rot since last visit. Pt also stated she saw her acupuncturist on Tuesday and her  did cupping and scraping to her L scapular area yesterday. Pt went to the gym on Monday after PT session. Pt rested for a couple days per her acupuncturist's request and went back to the gym yesterday.           Symptomatic Presentation:    Pain Location: [] Centralised     [x] Same    [] Peripheralized               Description:  L upper arm/middle deltoid with movement    Frequency: []Better    []Same   []Worse    Severity: 0/10 at rest; 3/10 with movement/activity (specifically activities at the gym and a swatting motion with L UE)         Functional Status:  % improvement since initial assessment:  %    Exercises:     Exercise  During After  Comments   NT Seated:    Ret    Ret/ext    Ret/ext/rot with towel 4x                  Pt reported significant dizziness with [] Same   [] Worse    [x] NA     Movement Loss: [x] Better    [] Same   [] Worse      Repeated Movements:   [x] Better    [] Same   [] Worse          SUMMARY: [x] Better    [] Same   [] Worse                    Classification Confirmed   [x]  Yes     [] No    Comments:  Assessed L shoulder this morning. Noted minimal movement loss in almost all directions of L shoulder ROM. Initiated L shoulder ROM with OP exercises this morning as well. Pt denied any significant change with IR or horizontal adduction. However, following L shoulder extension with PT-OP, pt reported significant decrease in L shoulder pain and significant improvement in L shoulder swatting motion ROM. Pt instructed in L shoulder ext with self-OP for HEP; pt verbalized understanding. Revised Classification (if appropriate):    [x] Derangement   [] Dysfunction   [] Posture           [] OTHER (subgroup)    Management Today:   [x] Posture      [x] HEP instruction     [x] Exercise  9/9/2022 - posture/use of lumbar roll, avoid cervical spine flexion and protrusion, seated ret/ext with self-OP 3x10 every 2-3 hours  9/12/2022 - supine ret/ext/rot with towel 10-15x every 4-5 hours as able, seated ret/ext/rot with towel begin incorporating as able depending on symptoms. Pt instructed to increase reps and frequency of supine exercises as able over the next couple of days depending on her occipital neuralgia symptoms. Pt verbalized understanding. 9/19/2022 - supine ret/L lateral flex with self-OP 2x10 followed by supine ret/ext/rot with towel 1x10 every 3-4 hours. Continue to monitor occipital neuralgia symptoms. 9/23/2022 - L shoulder extension with self-OP 3x10 every 304 hours      Plan for next session:  Reassess pt's response to HEP and progress with extension vs lateral principle as indicated. Reassess L shoulder following new HEP.         Barriers to Recovery:  Occipital neuralgia        CPT codes 9/23/2022 Units  Minutes   Low Complexity PT evaluation  91971     Moderate Complexity PT evaluation  75737     High Complexity PT evaluation [id number]     PT Re-evaluation  [id number]     Gait training [id number]     Manual therapy  52059     Therapeutic activities  00903 2    Therapeutic exercises  52853 1    Neuromuscular reeducation  [address]                                                                                                                                                                       Time In:  0249  Time Out:  [facility] 96, PT, DPT   [id number]

## 2022-09-30 ENCOUNTER — HOSPITAL ENCOUNTER (OUTPATIENT)
Dept: PHYSICAL THERAPY | Age: 47
Setting detail: THERAPIES SERIES
Discharge: HOME | End: 2022-09-30
Payer: COMMERCIAL

## 2022-09-30 PROCEDURE — 97530 THERAPEUTIC ACTIVITIES: CPT

## 2022-09-30 NOTE — PROGRESS NOTES
[address]                Phone: [phone]   Fax: [phone]    Physical Therapy Daily Treatment Note  Date:  2022    Patient Name:  Trudy Desouza    :  1975  MRN: [id number]    Referring Physician:  Lisa Li PA-C  Insurance Information:  [facility]      Evaluation date:  2022  Diagnosis:  Strain of L trapezius muscle; Spasm; Strain of rhomboid muscle  ICD-10 Codes:  Z83.558M; R25.2; S29.012A  Evaluating Physical Therapist:  Zahra Graves PT, DPT        Visit:       [address] RE-ASSESSMENT FORM      Check of Management Strategies:     Compliance / Commitment  [] Excellent   [x] Good   [] Fair   [] Poor    Posture Correction: [x]Yes   [] No    Performing Exercises:  [x]Yes   [] No    Frequency: [x] Appropriate [] Not appropriate                        Symptom Response during  exercises:  [] Increase   [x] Decrease   [] No  effect     Technique: [] Good  [] Needs correcting    Comments:  Pt stated her L shoulder ROM has improved significantly since her last visit. Pt was able to do push-ups on her knees without pain this past week. Pt was also able to lift weights with minimal pain. Pt stated her L shoulder \"feels unstuck. \"  Pt continues to report soreness on L side of neck and into trapezius muscle. Pt's  has been doing cupping, manual cervical traction, and chiropractic adjustments and pt is still going to acupuncturist every 2 weeks.             Symptomatic Presentation:    Pain Location: [] Centralised     [] Same    [] Peripheralized               Description:  L side of neck and trapezius soreness    Frequency: []Better    []Same   []Worse    Severity: 0/10 at rest; 3/10 with movement/activity (specifically activities at the gym and a swatting motion with L UE)         Functional Status:  % improvement since initial assessment:  90%    Exercises:     Exercise  During After  Comments   NT Seated:    Ret    Ret/ext    Ret/ext/rot with towel 4x                  Pt reported significant dizziness with seated exercises. NT Supine:        Ret/ext/rot 5x10 with towel    Ret/L rot with PT-OP 2x10    Ret/L lateral flex with PT-OP 2x10  With self-OP 1x10              P              P    P, NE                 NW/B              B    NW, NE             Pain produced down to L hand and into L scapula. Swatting with L UE - multiple times throughout session to reassess L UE pain    Floor push-ups on knees to assess L shoulder pain                NT Sustained extension                NT L shoulder IR with self-OP 2x10 NE NE           NT L shoulder horizontal ADD with self-OP 2x10    PT-OP 1x10 NE      NE NE      NE           NT L shoulder ext with PT-OP 2x10     NE B Improved L shoulder ROM with decreased pain 1/10           Shoulder distraction with trap stretch with MFB 3x 1:00                 **L UE strengthening PRN when able**                                         9/12/2022:  L rotation and L side bending - minimal movement loss but better when compared to initial evaluation; both directions increase L trapezius and neck pain but per pt, not as bad as during initial evaluation  R rotation and R side bending - WFL; both directions increase L trapezius and cervical spine pain but again, pt reported it was not as bad as it was during initial evaluation    9/19/2022:  Rotation R/L WFL, minimal pain with L rotation    9/23/2022:  R shoulder AROM WFL throughout. L shoulder extension with passive overpressure WFL; flexion, AB, IR, ER, and horizontal ADD minimal movement loss with passive overpressure    B scapulohumeral rhythm WFL    TTP: L trapezius and middle deltoid    L shoulder manual distraction - produced pain, numbness, and tingling from L trap muscle down to L fingers; symptoms abolished once distraction was discontinued    9/30/2022:  R shoulder AROM WFL throughout; no pain.   L shoulder ROM with passive overpressure WFL throughout except ER minimal movement loss. Pt reported mild pain with AB and ER. B UE strength grossly 4+/5 to 5/5 except L ER, IR, and triceps extension 4-/5. Mechanical Presentation:    Sitting Posture: []Good   [x]Fair  []Poor                  Standing Posture:  [x]Good   [x]Fair  []Poor      Deformity: [] Yes    [] No   [x] Not applicable                  Neurological Testing:  [] Better    [] Same   [] Worse    [x] NA     Movement Loss: [x] Better    [] Same   [] Worse      Repeated Movements:   [x] Better    [] Same   [] Worse          SUMMARY: [x] Better    [] Same   [] Worse                    Classification Confirmed   [x]  Yes     [] No    Comments:  Noted improvement in L shoulder ROM with passive overpressure this morning. Pt c/o minimal pain with AB and ER only. Initiated L shoulder distraction using MFB with trap stretch. Pt reported feeling \"looser\" after wards. Pt instructed to continue with shoulder extension at home and to incorporate shoulder distraction. Pt verbalized understanding. Revised Classification (if appropriate):    [x] Derangement   [] Dysfunction   [] Posture           [] OTHER (subgroup)    Management Today:   [x] Posture      [x] HEP instruction     [x] Exercise  9/9/2022 - posture/use of lumbar roll, avoid cervical spine flexion and protrusion, seated ret/ext with self-OP 3x10 every 2-3 hours  9/12/2022 - supine ret/ext/rot with towel 10-15x every 4-5 hours as able, seated ret/ext/rot with towel begin incorporating as able depending on symptoms. Pt instructed to increase reps and frequency of supine exercises as able over the next couple of days depending on her occipital neuralgia symptoms. Pt verbalized understanding. 9/19/2022 - supine ret/L lateral flex with self-OP 2x10 followed by supine ret/ext/rot with towel 1x10 every 3-4 hours. Continue to monitor occipital neuralgia symptoms.   9/23/2022 - L shoulder extension with self-OP 3x10 every 3-4 hours  9/30/2022 - continue with L shoulder ext with self-OP 3x10 every 3-4 hours; shoulder distraction 5x 1:00 2-3x/day      Plan for next session:  Reassess pt's response to HEP and progress as indicated.         Barriers to Recovery:  Occipital neuralgia        CPT codes 9/30/2022 Units  Minutes   Low Complexity PT evaluation  75898     Moderate Complexity PT evaluation  03646     High Complexity PT evaluation [id number]     PT Re-evaluation  [id number]     Gait training [id number]     Manual therapy  24148     Therapeutic activities  02168 2    Therapeutic exercises  19818     Neuromuscular reeducation  [id number]                                                                                                                                                                       Time In:  6557  Time Out:  [place] 57, PT, DPT   [id number]

## 2022-10-07 ENCOUNTER — HOSPITAL ENCOUNTER (OUTPATIENT)
Dept: PHYSICAL THERAPY | Age: 47
Setting detail: THERAPIES SERIES
Discharge: HOME | End: 2022-10-07

## 2022-10-07 NOTE — PROGRESS NOTES
[address] [facility]                Phone: [phone]  Fax: [phone]    Physical Therapy  Cancellation/No-show Note  Patient Name:  Shea Shafer  :  1975   Date:  10/7/2022    For today's appointment patient:  []  Cancelled  []  Rescheduled appointment  [x]  No-show     Reason given by patient:  []  Patient ill  []  Conflicting appointment  []  No transportation    []  Conflict with work  [x]  No reason given  []  Other:     Comments:  Pt did not show for scheduled PT session and has no further appointments scheduled at this time.     Electronically signed by:  Akilah Vega, PT, DPT

## 2023-01-18 ENCOUNTER — HOSPITAL ENCOUNTER (OUTPATIENT)
Dept: GENERAL RADIOLOGY | Age: 48
Discharge: HOME | End: 2023-01-20
Payer: COMMERCIAL

## 2023-01-18 DIAGNOSIS — Z12.31 BREAST CANCER SCREENING BY MAMMOGRAM: ICD-10-CM

## 2023-01-18 PROCEDURE — 77067 SCR MAMMO BI INCL CAD: CPT

## 2023-01-24 ENCOUNTER — TELEPHONE (OUTPATIENT)
Dept: BREAST CENTER | Age: 48
End: 2023-01-24

## 2023-01-24 DIAGNOSIS — R92.2 DENSE BREAST TISSUE ON MAMMOGRAM: Primary | ICD-10-CM

## 2023-01-24 NOTE — TELEPHONE ENCOUNTER
RN contacted patient to reschedule her appointment she canceled that was on 01/25/2023. Patient requesting to just be seen in July with her bilateral ultrasounds. RN explained the reason of being seen every 6 months. Patient stated is hard to get off work and would be best. Patient requesting to be seen by Anusha Cohen.  RN scheduled patient for B/L ultrasounds 7/24/23 @ 8am OV @ 9am.         Electronically signed by Esme Johnson RN on 1/24/23 at 9:00 AM EST

## 2023-07-10 ASSESSMENT — ENCOUNTER SYMPTOMS
BACK PAIN: 0
RHINORRHEA: 0
DIARRHEA: 0
COUGH: 0
CONSTIPATION: 0
BLOOD IN STOOL: 0
EYE DISCHARGE: 0
CHOKING: 0
SINUS PRESSURE: 0
ABDOMINAL DISTENTION: 0
WHEEZING: 0
EYE ITCHING: 0
SORE THROAT: 0
ABDOMINAL PAIN: 0
TROUBLE SWALLOWING: 0
SINUS PAIN: 0
VOICE CHANGE: 0
CHEST TIGHTNESS: 0
NAUSEA: 0
VOMITING: 0
SHORTNESS OF BREATH: 0

## 2023-07-10 NOTE — PROGRESS NOTES
findings with no sonographic evidence of malignancy in either breast.     RECOMMENDATION:     Annual bilateral mammogram is advised with consideration for annual bilateral  breast MRI given the patient's TC score of 30.1%. BIRADS:  BIRADS - CATEGORY 2     Benign Findings. OVERALL ASSESSMENT - BENIGN      All images of ultrasound reviewed with patient. Benign findings bilaterally. High risk active surveillance we will continue with bilateral digital mammogram in 6 months, bilateral complete breast US and and clinical breast examination in 12 months. Will see Dr. Carmen Ling at her next OV. Patient will continue breast self exams monthly. Questions answered to patient's satisfaction. Plan:    Continue monthly breast self examination; detailed instructions reviewed today. Bring any changes to your physician's attention. Continue healthy diet and exercise routinely as tolerated to maintain IBW. Avoid alcohol. Limit caffeine intake. Repeat mammogram Jan 18 (or after) 2024. Repeat bilateral breast US in July 2024. Continue follow up with Primary Care and Gynecology. RTC July 2024 with Dr. Carmen Ling. During today's visit, face-to-face time 27 minutes, greater than 50% in counseling education and coordination of care. All questions were answered to her apparent satisfaction, and she is agreeable to the plan as outlined above. Dr Marie Wheat.  MD Delvin Rasheed  7/24/2023

## 2023-07-19 ENCOUNTER — OFFICE VISIT (OUTPATIENT)
Dept: SURGERY | Age: 48
End: 2023-07-19
Payer: COMMERCIAL

## 2023-07-19 VITALS
TEMPERATURE: 98.6 F | DIASTOLIC BLOOD PRESSURE: 78 MMHG | OXYGEN SATURATION: 99 % | WEIGHT: 129 LBS | HEART RATE: 82 BPM | BODY MASS INDEX: 22.14 KG/M2 | SYSTOLIC BLOOD PRESSURE: 114 MMHG

## 2023-07-19 DIAGNOSIS — G24.5 BLEPHAROSPASM OF LEFT EYE: Primary | ICD-10-CM

## 2023-07-19 PROCEDURE — 99202 OFFICE O/P NEW SF 15 MIN: CPT | Performed by: PHYSICIAN ASSISTANT

## 2023-07-19 NOTE — PROGRESS NOTES
Department of Plastic Surgery  Facial Consult Note          CHIEF COMPLAINT:  left lower eyelid spasm    History Obtained From:  patient    HISTORY OF PRESENT ILLNESS:                The patient is a 41 y.o. female who presents with left lower eyelid spasm. She states she developed the lower left lateral eyelid spasm 2 months prior. She states that there were no aggravating or alleviating factors to the spasm. She states that it was constant and repetitive for hours at a time when the spasm would occur. She states that she has had a history of migraines in the past however she has not recently had migraines and does not relate this to her spasm. She states that 10 days prior to today her spasm stopped and she has not had a spasm to her left lower eyelid since that time. She initially contacted her family physician for recommendations and she was told at that time to follow with her neurology or her ophthalmologist as well as begin a vitamin supplement. She states she did start taking vitamin supplements such as magnesium at that time. She is still attempting to get into the neurologist and was told by her ophthalmologist that they did not treat this symptomatology and was referred a list of providers that may help with blepharospasm. She denies any visual changes she denies any neurologic changes. Past Medical History:    Past Medical History:   Diagnosis Date    Fibrocystic breast     Dr. Krystian Montemayor    Headache(784.0)     saw Dr. Carola Lincoln and several ENTs--now sees chiropractor and accupuncturist    Kidney stone     x2    Murmur      Past Surgical History:    Past Surgical History:   Procedure Laterality Date    BREAST SURGERY  12/2011    Benign--needle bx     Current Medications:   No current facility-administered medications for this visit.   Allergies:  Flagyl [metronidazole], Tetracyclines & related, Seldane [terfenadine], and Terazol [terconazole]    Social History:   Social History

## 2023-07-24 ENCOUNTER — OFFICE VISIT (OUTPATIENT)
Dept: BREAST CENTER | Age: 48
End: 2023-07-24
Payer: COMMERCIAL

## 2023-07-24 ENCOUNTER — HOSPITAL ENCOUNTER (OUTPATIENT)
Dept: GENERAL RADIOLOGY | Age: 48
Discharge: HOME | End: 2023-07-26
Payer: COMMERCIAL

## 2023-07-24 VITALS
HEIGHT: 64 IN | WEIGHT: 129 LBS | OXYGEN SATURATION: 100 % | RESPIRATION RATE: 18 BRPM | HEART RATE: 59 BPM | TEMPERATURE: 98.2 F | BODY MASS INDEX: 22.02 KG/M2 | DIASTOLIC BLOOD PRESSURE: 70 MMHG | SYSTOLIC BLOOD PRESSURE: 112 MMHG

## 2023-07-24 DIAGNOSIS — Z12.31 ENCOUNTER FOR SCREENING MAMMOGRAM FOR BREAST CANCER: Primary | ICD-10-CM

## 2023-07-24 DIAGNOSIS — Z91.89 AT HIGH RISK FOR BREAST CANCER: Primary | ICD-10-CM

## 2023-07-24 DIAGNOSIS — R92.2 DENSE BREAST TISSUE ON MAMMOGRAM: ICD-10-CM

## 2023-07-24 PROCEDURE — 99213 OFFICE O/P EST LOW 20 MIN: CPT | Performed by: SURGERY

## 2023-07-24 PROCEDURE — 76641 ULTRASOUND BREAST COMPLETE: CPT

## 2023-07-24 PROCEDURE — 99214 OFFICE O/P EST MOD 30 MIN: CPT | Performed by: SURGERY

## 2024-01-04 ENCOUNTER — OFFICE VISIT (OUTPATIENT)
Dept: FAMILY MEDICINE CLINIC | Age: 49
End: 2024-01-04
Payer: COMMERCIAL

## 2024-01-04 VITALS
BODY MASS INDEX: 21.85 KG/M2 | OXYGEN SATURATION: 98 % | WEIGHT: 128 LBS | HEIGHT: 64 IN | DIASTOLIC BLOOD PRESSURE: 68 MMHG | TEMPERATURE: 98.9 F | SYSTOLIC BLOOD PRESSURE: 116 MMHG | HEART RATE: 68 BPM

## 2024-01-04 DIAGNOSIS — J10.1 INFLUENZA A: ICD-10-CM

## 2024-01-04 DIAGNOSIS — R05.9 COUGH, UNSPECIFIED TYPE: Primary | ICD-10-CM

## 2024-01-04 LAB
INFLUENZA A ANTIBODY: POSITIVE
INFLUENZA B ANTIBODY: NEGATIVE
S PYO AG THROAT QL: NORMAL

## 2024-01-04 PROCEDURE — 87880 STREP A ASSAY W/OPTIC: CPT | Performed by: PHYSICIAN ASSISTANT

## 2024-01-04 PROCEDURE — 99213 OFFICE O/P EST LOW 20 MIN: CPT | Performed by: FAMILY MEDICINE

## 2024-01-04 PROCEDURE — 87804 INFLUENZA ASSAY W/OPTIC: CPT | Performed by: PHYSICIAN ASSISTANT

## 2024-01-04 RX ORDER — OSELTAMIVIR PHOSPHATE 75 MG/1
75 CAPSULE ORAL 2 TIMES DAILY
Qty: 10 CAPSULE | Refills: 0 | Status: SHIPPED | OUTPATIENT
Start: 2024-01-04 | End: 2024-01-09

## 2024-01-04 ASSESSMENT — ENCOUNTER SYMPTOMS
SHORTNESS OF BREATH: 0
ABDOMINAL PAIN: 0
PHOTOPHOBIA: 0
DIARRHEA: 0
EYE REDNESS: 0
NAUSEA: 0
BACK PAIN: 0
SORE THROAT: 1
ALLERGIC/IMMUNOLOGIC NEGATIVE: 1
TROUBLE SWALLOWING: 0
BLOOD IN STOOL: 0
SINUS PAIN: 0
VOMITING: 0
COUGH: 1
EYE DISCHARGE: 0
CHEST TIGHTNESS: 0
EYE PAIN: 0

## 2024-01-04 NOTE — PROGRESS NOTES
tone.      Coordination: Coordination normal.      Deep Tendon Reflexes: Reflexes normal.             Seen By:  Vickey Kahn DO

## 2024-01-19 ENCOUNTER — HOSPITAL ENCOUNTER (OUTPATIENT)
Dept: GENERAL RADIOLOGY | Age: 49
Discharge: HOME | End: 2024-01-19
Payer: COMMERCIAL

## 2024-01-19 VITALS — WEIGHT: 120 LBS | BODY MASS INDEX: 20.49 KG/M2 | HEIGHT: 64 IN

## 2024-01-19 DIAGNOSIS — Z12.31 ENCOUNTER FOR SCREENING MAMMOGRAM FOR BREAST CANCER: ICD-10-CM

## 2024-01-19 PROCEDURE — 77063 BREAST TOMOSYNTHESIS BI: CPT

## 2024-03-26 LAB
25(OH)D3 SERPL-MCNC: 38.3 NG/ML (ref 30–100)
BASOPHILS # BLD: 0.06 K/UL (ref 0–0.2)
BASOPHILS NFR BLD: 2 % (ref 0–2)
EOSINOPHIL # BLD: 0.1 K/UL (ref 0.05–0.5)
EOSINOPHILS RELATIVE PERCENT: 3 % (ref 0–6)
ERYTHROCYTE [DISTWIDTH] IN BLOOD BY AUTOMATED COUNT: 12.6 % (ref 11.5–15)
HCT VFR BLD AUTO: 41.3 % (ref 34–48)
HGB BLD-MCNC: 13.8 G/DL (ref 11.5–15.5)
IMM GRANULOCYTES # BLD AUTO: <0.03 K/UL (ref 0–0.58)
IMM GRANULOCYTES NFR BLD: 0 % (ref 0–5)
LYMPHOCYTES NFR BLD: 1.82 K/UL (ref 1.5–4)
LYMPHOCYTES RELATIVE PERCENT: 47 % (ref 20–42)
MCH RBC QN AUTO: 32 PG (ref 26–35)
MCHC RBC AUTO-ENTMCNC: 33.4 G/DL (ref 32–34.5)
MCV RBC AUTO: 95.8 FL (ref 80–99.9)
MONOCYTES NFR BLD: 0.4 K/UL (ref 0.1–0.95)
MONOCYTES NFR BLD: 10 % (ref 2–12)
NEUTROPHILS NFR BLD: 39 % (ref 43–80)
NEUTS SEG NFR BLD: 1.51 K/UL (ref 1.8–7.3)
PLATELET # BLD AUTO: 187 K/UL (ref 130–450)
PMV BLD AUTO: 11.3 FL (ref 7–12)
RBC # BLD AUTO: 4.31 M/UL (ref 3.5–5.5)
TSH SERPL DL<=0.05 MIU/L-ACNC: 1.98 UIU/ML (ref 0.27–4.2)
WBC OTHER # BLD: 3.9 K/UL (ref 4.5–11.5)

## 2024-03-27 LAB — ANA SER QL IA: NEGATIVE

## 2024-03-28 LAB
SHBG SERPL-SCNC: 131 NMOL/L (ref 25–122)
TESTOST FREE MFR SERPL: <1 PG/ML (ref 1.1–5.8)
TESTOST SERPL-MCNC: 8 NG/DL (ref 8–48)

## 2024-03-30 LAB — DHEA: 2.25 NG/ML (ref 0.63–4.7)

## 2024-05-16 LAB
CHOLEST SERPL-MCNC: 238 MG/DL
HBA1C MFR BLD: 5.5 % (ref 4–5.6)
HDLC SERPL-MCNC: 85 MG/DL
LDLC SERPL CALC-MCNC: 142 MG/DL
TRIGL SERPL-MCNC: 53 MG/DL
VLDLC SERPL CALC-MCNC: 11 MG/DL

## 2024-05-23 DIAGNOSIS — R92.333 HETEROGENEOUSLY DENSE TISSUE OF BOTH BREASTS ON MAMMOGRAPHY: ICD-10-CM

## 2024-05-23 DIAGNOSIS — Z91.89 AT HIGH RISK FOR BREAST CANCER: ICD-10-CM

## 2024-05-23 DIAGNOSIS — Z80.3 FAMILY HISTORY OF BREAST CANCER IN FIRST DEGREE RELATIVE: Primary | ICD-10-CM

## 2024-06-14 LAB
ESTRADIOL LEVEL: 114.8 PG/ML
FSH SERPL-ACNC: 11.8 MIU/ML
T3 SERPL-MCNC: 107 NG/DL (ref 80–200)
T4 SERPL-MCNC: 6.4 UG/DL (ref 4.5–11.7)
TSH SERPL DL<=0.05 MIU/L-ACNC: 1.57 UIU/ML (ref 0.27–4.2)

## 2024-06-20 LAB — DHEA: 3.76 NG/ML (ref 0.63–4.7)

## 2024-07-30 ENCOUNTER — HOSPITAL ENCOUNTER (OUTPATIENT)
Dept: GENERAL RADIOLOGY | Age: 49
Discharge: HOME | End: 2024-08-01
Payer: COMMERCIAL

## 2024-07-30 VITALS — HEIGHT: 64 IN | WEIGHT: 120 LBS | BODY MASS INDEX: 20.49 KG/M2

## 2024-07-30 DIAGNOSIS — R92.333 HETEROGENEOUSLY DENSE TISSUE OF BOTH BREASTS ON MAMMOGRAPHY: ICD-10-CM

## 2024-07-30 DIAGNOSIS — Z91.89 AT HIGH RISK FOR BREAST CANCER: ICD-10-CM

## 2024-07-30 DIAGNOSIS — Z80.3 FAMILY HISTORY OF BREAST CANCER IN FIRST DEGREE RELATIVE: ICD-10-CM

## 2024-07-30 PROCEDURE — 76641 ULTRASOUND BREAST COMPLETE: CPT

## 2024-08-06 ENCOUNTER — OFFICE VISIT (OUTPATIENT)
Dept: BREAST CENTER | Age: 49
End: 2024-08-06
Payer: COMMERCIAL

## 2024-08-06 VITALS
BODY MASS INDEX: 21.51 KG/M2 | WEIGHT: 126 LBS | HEART RATE: 69 BPM | SYSTOLIC BLOOD PRESSURE: 102 MMHG | RESPIRATION RATE: 14 BRPM | TEMPERATURE: 98.7 F | DIASTOLIC BLOOD PRESSURE: 64 MMHG | HEIGHT: 64 IN | OXYGEN SATURATION: 99 %

## 2024-08-06 DIAGNOSIS — Z91.89 AT HIGH RISK FOR BREAST CANCER: Primary | ICD-10-CM

## 2024-08-06 PROCEDURE — 99212 OFFICE O/P EST SF 10 MIN: CPT | Performed by: SURGERY

## 2024-08-06 PROCEDURE — 99213 OFFICE O/P EST LOW 20 MIN: CPT | Performed by: SURGERY

## 2024-08-06 RX ORDER — VITAMIN B COMPLEX
1 CAPSULE ORAL DAILY
COMMUNITY

## 2024-08-06 RX ORDER — MULTIVIT-MIN/IRON/FOLIC ACID/K 18-600-40
CAPSULE ORAL
COMMUNITY

## 2024-08-06 NOTE — PROGRESS NOTES
I was also very comfortable with that decision.  She has had this questions with prior physicians regarding risk reduction with medications.  She has opted for enhanced surveillance.  I informed her that as she transitions through menopause there are more patient friendly medications.    I informed Chikis that we should continue the current plan.  She will be due for screening mammogram in January.  Assuming no irregularities and we will see her next year around this time and she will have whole breast ultrasound.      This note was created with voice recognition software.  Please excuse any grammatical errors that were not corrected and please contact me if there are any questions.    [email or web address]  [phone]

## 2024-08-06 NOTE — PATIENT INSTRUCTIONS
Fidelina Cary, National Certified Menopause Provider  Address: [facility] [address], [place] [address]  Phone: [phone]  [email or web address]

## 2024-09-16 ENCOUNTER — OFFICE VISIT (OUTPATIENT)
Dept: FAMILY MEDICINE CLINIC | Age: 49
End: 2024-09-16
Payer: COMMERCIAL

## 2024-09-16 VITALS
WEIGHT: 127 LBS | HEART RATE: 79 BPM | SYSTOLIC BLOOD PRESSURE: 116 MMHG | BODY MASS INDEX: 21.68 KG/M2 | OXYGEN SATURATION: 98 % | HEIGHT: 64 IN | DIASTOLIC BLOOD PRESSURE: 80 MMHG | TEMPERATURE: 98.7 F

## 2024-09-16 DIAGNOSIS — J01.90 ACUTE NON-RECURRENT SINUSITIS, UNSPECIFIED LOCATION: Primary | ICD-10-CM

## 2024-09-16 DIAGNOSIS — J06.9 ACUTE UPPER RESPIRATORY INFECTION, UNSPECIFIED: ICD-10-CM

## 2024-09-16 PROCEDURE — 99213 OFFICE O/P EST LOW 20 MIN: CPT | Performed by: PHYSICIAN ASSISTANT

## 2024-09-16 RX ORDER — BROMPHENIRAMINE MALEATE, PSEUDOEPHEDRINE HYDROCHLORIDE, AND DEXTROMETHORPHAN HYDROBROMIDE 2; 30; 10 MG/5ML; MG/5ML; MG/5ML
5 SYRUP ORAL 4 TIMES DAILY PRN
Qty: 473 ML | Refills: 0 | Status: SHIPPED | OUTPATIENT
Start: 2024-09-16

## 2024-09-16 RX ORDER — AZITHROMYCIN 250 MG/1
TABLET, FILM COATED ORAL
Qty: 6 TABLET | Refills: 0 | Status: SHIPPED | OUTPATIENT
Start: 2024-09-16 | End: 2024-09-26

## 2025-02-05 ENCOUNTER — HOSPITAL ENCOUNTER (OUTPATIENT)
Dept: GENERAL RADIOLOGY | Age: 50
Discharge: HOME | End: 2025-02-07
Payer: COMMERCIAL

## 2025-02-05 VITALS — WEIGHT: 125 LBS | BODY MASS INDEX: 21.45 KG/M2

## 2025-02-05 DIAGNOSIS — Z91.89 AT HIGH RISK FOR BREAST CANCER: ICD-10-CM

## 2025-02-05 PROCEDURE — 77063 BREAST TOMOSYNTHESIS BI: CPT

## 2025-02-06 DIAGNOSIS — R92.8 ABNORMAL MAMMOGRAM: Primary | ICD-10-CM

## 2025-02-10 ENCOUNTER — HOSPITAL ENCOUNTER (OUTPATIENT)
Dept: GENERAL RADIOLOGY | Age: 50
Discharge: HOME | End: 2025-02-12
Attending: SURGERY
Payer: COMMERCIAL

## 2025-02-10 DIAGNOSIS — R92.8 ABNORMAL MAMMOGRAM: Primary | ICD-10-CM

## 2025-02-10 DIAGNOSIS — R92.8 ABNORMAL MAMMOGRAM: ICD-10-CM

## 2025-02-10 PROCEDURE — 76642 ULTRASOUND BREAST LIMITED: CPT

## 2025-02-10 PROCEDURE — G0279 TOMOSYNTHESIS, MAMMO: HCPCS

## 2025-02-19 ENCOUNTER — HOSPITAL ENCOUNTER (OUTPATIENT)
Dept: GENERAL RADIOLOGY | Age: 50
Discharge: HOME | End: 2025-02-21
Payer: COMMERCIAL

## 2025-02-19 DIAGNOSIS — R92.8 ABNORMAL MAMMOGRAM: ICD-10-CM

## 2025-02-19 PROCEDURE — A4648 IMPLANTABLE TISSUE MARKER: HCPCS

## 2025-02-19 PROCEDURE — 77065 DX MAMMO INCL CAD UNI: CPT

## 2025-02-19 NOTE — PROGRESS NOTES
Met with patient prior to her breast biopsy. Instructed on ultrasound guided  breast biopsy procedure. Denies use of blood thinners or aspirin products within the past 5 days.  Instructed that results will be available in approximately 3-5 business days, and Dr. Elena will call her when results become available. She confirms that she has an active [facility] MyChart account.  Provided with folder containing my contact information and post biopsy discharge instructions. Instructed to call me if she has any questions or concerns about her biopsy. Verbalizes understanding. Electronically signed by Lexi Larson RN, BSN on 2/19/2025 at 2:11 PM

## 2025-02-27 LAB — SURGICAL PATHOLOGY REPORT: NORMAL

## 2025-02-28 ENCOUNTER — TELEPHONE (OUTPATIENT)
Dept: BREAST CENTER | Age: 50
End: 2025-02-28

## 2025-02-28 DIAGNOSIS — C50.911 MALIGNANT NEOPLASM OF RIGHT FEMALE BREAST, UNSPECIFIED ESTROGEN RECEPTOR STATUS, UNSPECIFIED SITE OF BREAST (HCC): Primary | ICD-10-CM

## 2025-02-28 DIAGNOSIS — C50.911 INVASIVE DUCTAL CARCINOMA OF RIGHT BREAST (HCC): Primary | ICD-10-CM

## 2025-02-28 RX ORDER — LORAZEPAM 1 MG/1
TABLET ORAL
Qty: 2 TABLET | Refills: 0 | Status: SHIPPED | OUTPATIENT
Start: 2025-02-28 | End: 2025-03-28

## 2025-02-28 NOTE — TELEPHONE ENCOUNTER
No prior authorization required for breast MRI  [id number] per [facility] insurance  - Decision ID: [id number]     Scheduled for 3/6/25

## 2025-02-28 NOTE — TELEPHONE ENCOUNTER
RN contacted patient and notified that  called in medication to pharmacy. RN advised patient she will need a  to the appointments as she will not be able to drive. Patient verbalized understanding. RN advised patient is she can be here at 1215pm on 03/6/2025 with have CXR and breast MRI. RN advised patient was scheduled for an appointment on 3/11/2025 @ 9am with  for follow up to discuss all information. Patient verbalized understanding.       Electronically signed by Meredith Soler RN on 2/28/25 at 9:56 AM EST

## 2025-02-28 NOTE — TELEPHONE ENCOUNTER
Patient is scheduled for breast MRI  on 03/06/25 (next Thursday) patient contacted out office stating that she is claustrophobic and would like something called in for the day of her MRI.  MA checked patient pharmacy, which is [facility]  in [facility].  Pharmacy is correct in chart.

## 2025-03-06 ENCOUNTER — HOSPITAL ENCOUNTER (OUTPATIENT)
Dept: MRI IMAGING | Age: 50
Discharge: HOME | End: 2025-03-08
Attending: SURGERY
Payer: COMMERCIAL

## 2025-03-06 ENCOUNTER — HOSPITAL ENCOUNTER (OUTPATIENT)
Dept: GENERAL RADIOLOGY | Age: 50
Discharge: HOME | End: 2025-03-08
Attending: SURGERY
Payer: COMMERCIAL

## 2025-03-06 DIAGNOSIS — C50.911 INVASIVE DUCTAL CARCINOMA OF RIGHT BREAST (HCC): ICD-10-CM

## 2025-03-06 DIAGNOSIS — C50.911 MALIGNANT NEOPLASM OF RIGHT FEMALE BREAST, UNSPECIFIED ESTROGEN RECEPTOR STATUS, UNSPECIFIED SITE OF BREAST (HCC): ICD-10-CM

## 2025-03-06 PROCEDURE — A9585 GADOBUTROL INJECTION: HCPCS | Performed by: RADIOLOGY

## 2025-03-06 PROCEDURE — 71046 X-RAY EXAM CHEST 2 VIEWS: CPT

## 2025-03-06 PROCEDURE — 6360000004 HC RX CONTRAST MEDICATION: Performed by: RADIOLOGY

## 2025-03-06 PROCEDURE — C8908 MRI W/O FOL W/CONT, BREAST,: HCPCS

## 2025-03-06 RX ORDER — GADOBUTROL 604.72 MG/ML
6 INJECTION INTRAVENOUS
Status: COMPLETED | OUTPATIENT
Start: 2025-03-06 | End: 2025-03-06

## 2025-03-06 RX ADMIN — GADOBUTROL 6 ML: 604.72 INJECTION INTRAVENOUS at 14:24

## 2025-03-11 ENCOUNTER — TELEPHONE (OUTPATIENT)
Dept: CASE MANAGEMENT | Age: 50
End: 2025-03-11

## 2025-03-11 ENCOUNTER — OFFICE VISIT (OUTPATIENT)
Dept: BREAST CENTER | Age: 50
End: 2025-03-11
Payer: COMMERCIAL

## 2025-03-11 VITALS
TEMPERATURE: 98 F | SYSTOLIC BLOOD PRESSURE: 112 MMHG | OXYGEN SATURATION: 100 % | HEART RATE: 63 BPM | DIASTOLIC BLOOD PRESSURE: 60 MMHG | RESPIRATION RATE: 20 BRPM | HEIGHT: 64 IN | WEIGHT: 124 LBS | BODY MASS INDEX: 21.17 KG/M2

## 2025-03-11 DIAGNOSIS — Z85.3 PERSONAL HISTORY OF MALIGNANT NEOPLASM OF BREAST: ICD-10-CM

## 2025-03-11 DIAGNOSIS — C50.911 INVASIVE DUCTAL CARCINOMA OF RIGHT BREAST (HCC): Primary | ICD-10-CM

## 2025-03-11 DIAGNOSIS — Z80.3 FAMILY HISTORY OF BREAST CANCER IN FIRST DEGREE RELATIVE: ICD-10-CM

## 2025-03-11 DIAGNOSIS — C50.911 INVASIVE DUCTAL CARCINOMA OF RIGHT BREAST: ICD-10-CM

## 2025-03-11 DIAGNOSIS — Z80.3 FAMILY HISTORY OF MALIGNANT NEOPLASM OF BREAST: ICD-10-CM

## 2025-03-11 LAB
ALBUMIN: 4.3 G/DL (ref 3.5–5.2)
ALP BLD-CCNC: 41 U/L (ref 35–104)
ALT SERPL-CCNC: 9 U/L (ref 0–32)
ANION GAP SERPL CALCULATED.3IONS-SCNC: 13 MMOL/L (ref 7–16)
AST SERPL-CCNC: 18 U/L (ref 0–31)
BASOPHILS ABSOLUTE: 0.04 K/UL (ref 0–0.2)
BASOPHILS RELATIVE PERCENT: 1 % (ref 0–2)
BILIRUB SERPL-MCNC: 0.4 MG/DL (ref 0–1.2)
BUN BLDV-MCNC: 13 MG/DL (ref 6–20)
CALCIUM SERPL-MCNC: 9.3 MG/DL (ref 8.6–10.2)
CHLORIDE BLD-SCNC: 107 MMOL/L (ref 98–107)
CO2: 20 MMOL/L (ref 22–29)
CREAT SERPL-MCNC: 0.9 MG/DL (ref 0.5–1)
EOSINOPHILS ABSOLUTE: 0.07 K/UL (ref 0.05–0.5)
EOSINOPHILS RELATIVE PERCENT: 1 % (ref 0–6)
GFR, ESTIMATED: 84 ML/MIN/1.73M2
GLUCOSE BLD-MCNC: 92 MG/DL (ref 74–99)
HCT VFR BLD CALC: 41.6 % (ref 34–48)
HEMOGLOBIN: 13.8 G/DL (ref 11.5–15.5)
IMMATURE GRANULOCYTES %: 0 % (ref 0–5)
IMMATURE GRANULOCYTES ABSOLUTE: <0.03 K/UL (ref 0–0.58)
LYMPHOCYTES ABSOLUTE: 1.52 K/UL (ref 1.5–4)
LYMPHOCYTES RELATIVE PERCENT: 30 % (ref 20–42)
MCH RBC QN AUTO: 31.9 PG (ref 26–35)
MCHC RBC AUTO-ENTMCNC: 33.2 G/DL (ref 32–34.5)
MCV RBC AUTO: 96.1 FL (ref 80–99.9)
MONOCYTES ABSOLUTE: 0.44 K/UL (ref 0.1–0.95)
MONOCYTES RELATIVE PERCENT: 9 % (ref 2–12)
NEUTROPHILS ABSOLUTE: 3 K/UL (ref 1.8–7.3)
NEUTROPHILS RELATIVE PERCENT: 59 % (ref 43–80)
PDW BLD-RTO: 12.2 % (ref 11.5–15)
PLATELET # BLD: 210 K/UL (ref 130–450)
PMV BLD AUTO: 10.9 FL (ref 7–12)
POTASSIUM SERPL-SCNC: 4.1 MMOL/L (ref 3.5–5)
RBC # BLD: 4.33 M/UL (ref 3.5–5.5)
SODIUM BLD-SCNC: 140 MMOL/L (ref 132–146)
TOTAL PROTEIN: 7.1 G/DL (ref 6.4–8.3)
WBC # BLD: 5.1 K/UL (ref 4.5–11.5)

## 2025-03-11 PROCEDURE — 36415 COLL VENOUS BLD VENIPUNCTURE: CPT | Performed by: SURGERY

## 2025-03-11 PROCEDURE — 99214 OFFICE O/P EST MOD 30 MIN: CPT | Performed by: SURGERY

## 2025-03-11 PROCEDURE — 99213 OFFICE O/P EST LOW 20 MIN: CPT | Performed by: SURGERY

## 2025-03-11 NOTE — TELEPHONE ENCOUNTER
Met with patient regarding her recent breast cancer diagnosis at surgical consultation appointment with Dr.Keenan CEDENO,PhD. Reviewed pathology report.Instructed patient on her breast biopsy pathology findings including cancer type (IDC) and hormone receptor status (ER + OK + Her2 -). Instructed on next steps including breast surgery options per Dr.Keenan CEDENO,PhD recommendations and any additional imaging that may be required. Provided with extensive literature including \"Be A Survivor: Your guide to Breast Cancer Treatment\", chapter 4 reviewed, Your Guide to Your Breast Cancer Pathology Report, NCCN Patient Resource card,American College of Surgeons Exercises after Breast Surgery, written information from [employer] Lymphedema:The Basics, American Cancer Society Clinical Trials and Reach for Recovery program flyer.Today patient received copy of their pathology report as well as a list of [facility] providers, information on diagnosis, transportation resources, Nutrition and Physical Activity flyer from [employer] and local/online support group resources. Patient given opportunity to ask questions. All questions answered to the best of my ability.Encouraged patient to call the office with any further medical oncology questions. Patient verbalizes understanding and appreciative of nurse navigator visit. RADU [place]N,RN-OCN

## 2025-03-11 NOTE — PATIENT INSTRUCTIONS
Dr Rios visit  ,PCP clearance  Surgery will be scheduled as soon as we can coordinate with Dr Rios

## 2025-03-11 NOTE — PROGRESS NOTES
Date of Visit: 3/11/2025  New Patient Invasive Breast Cancer    07/24/23: NG  08/06/24 03/11/25: New Cancer Diagnosis    DIAGNOSIS:  1. (08/06/24) Higher risk for breast cancer  VANDANA 30% (17% per radiology)  2. Family history of breast cancer  * Mother/63  * Maternal aunt/39  3. Mother genetic testing negative  4. History of breast biopsy  5. History of abnormal SBE  * RIGHT IMF  6. (02/19/25) RIGHT (1:00-3) invasive ductal/lobular carcinoma  Grade 1  Clinical stage: [id number]  ER (95) ID (75) HER 2 (0)    IMAGING/PROCEDURES:  Patient deferring MRI re gadolinium  1. (07/24/23) WBUS: BIRADS-2  * scattered cysts  2. (01/19/24) BILATERAL st-mammogram: BIRADS-1  3. (07/30/24) WBUS: BIRADS-2  4. (02/05/25) BILATERAL st-mammogram: BIRADS-0  * RIGHT architectural distortion  5. (02/10/25) RIGHT dt-mammogram and US: BIRADS-4  * RIGHT persistent architectural distortion  * RIGHT (1:00-3) 4mm mass  6. (02/19/25) RIGHT US biopsy      HISTORY OF PRESENT ILLNESS  Chikis Green was in the office today for what is now a diagnosis of an early stage and biologically favorable right breast cancer.    Chikis had been followed in our breast clinic for some time.  There is noted to be some findings on her recent mammogram.  She was eventually shown to have a 4 mm invasive carcinoma.  A breast MRI excluded any other disease.    She is in the office now to discuss the above and receive treatment recommendations.    BREAST SYMPTOMS  Chikis has had no symptoms to report.  Specifically she has no masses.  She reports no skin retraction or discoloration.  She reports no nipple discharge or retraction.    PAST BREAST HISTORY  Chikis has had no prior biopsies nor has she had any breast surgeries.    BREAST CANCER RISK FACTORS  Family history: There is a family history of breast cancer.  The patient's mother is a breast cancer survivor.  The patient's mother underwent genetic testing and was negative.    PAST MEDICAL/SURGICAL HISTORY  Past

## 2025-03-13 ENCOUNTER — OFFICE VISIT (OUTPATIENT)
Dept: SURGERY | Age: 50
End: 2025-03-13

## 2025-03-13 VITALS
HEART RATE: 64 BPM | DIASTOLIC BLOOD PRESSURE: 64 MMHG | TEMPERATURE: 98.2 F | HEIGHT: 64 IN | WEIGHT: 125 LBS | BODY MASS INDEX: 21.34 KG/M2 | SYSTOLIC BLOOD PRESSURE: 109 MMHG

## 2025-03-13 DIAGNOSIS — Z85.3 HISTORY OF RIGHT BREAST CANCER: Primary | ICD-10-CM

## 2025-03-13 RX ORDER — CEPHALEXIN 500 MG/1
500 CAPSULE ORAL 3 TIMES DAILY
COMMUNITY

## 2025-03-18 LAB
Lab: NEGATIVE
Lab: NORMAL

## 2025-03-20 ENCOUNTER — PATIENT MESSAGE (OUTPATIENT)
Dept: BREAST CENTER | Age: 50
End: 2025-03-20

## 2025-03-20 ENCOUNTER — RESULTS FOLLOW-UP (OUTPATIENT)
Dept: BREAST CENTER | Age: 50
End: 2025-03-20

## 2025-03-21 ENCOUNTER — PREP FOR PROCEDURE (OUTPATIENT)
Dept: BREAST CENTER | Age: 50
End: 2025-03-21

## 2025-03-21 ENCOUNTER — TELEPHONE (OUTPATIENT)
Dept: SURGERY | Age: 50
End: 2025-03-21

## 2025-03-21 DIAGNOSIS — C50.911 INVASIVE DUCTAL CARCINOMA OF RIGHT BREAST: Primary | ICD-10-CM

## 2025-03-21 NOTE — TELEPHONE ENCOUNTER
Joint case with Dr. Urbano left prophylactic mastectomy ,closure right ,mastectomy defect  Surgery has been scheduled at [address] on 4/3/2025  , Pre-Admission Testing will call you prior to surgery to inform you arrival time and any other additional directions,if they are unable to reach you,please call them two days prior at [phone].  If taking Fish Oil, Vitamins, two weeks prior to surgery stop taking. If taking NSAIDS (such as Aspirin, Ibuprofen) anticoagulants please consult with your prescribing physician to get further instructions on when to stop medication prior to surgery that is scheduled, patient understood.    Pre-Auth #:  CPT Codes:   ICD 10:

## 2025-03-27 RX ORDER — SODIUM CHLORIDE 0.9 % (FLUSH) 0.9 %
5-40 SYRINGE (ML) INJECTION EVERY 12 HOURS SCHEDULED
Status: CANCELLED | OUTPATIENT
Start: 2025-03-27

## 2025-03-27 RX ORDER — SODIUM CHLORIDE, SODIUM LACTATE, POTASSIUM CHLORIDE, CALCIUM CHLORIDE 600; 310; 30; 20 MG/100ML; MG/100ML; MG/100ML; MG/100ML
INJECTION, SOLUTION INTRAVENOUS CONTINUOUS
Status: CANCELLED | OUTPATIENT
Start: 2025-03-27

## 2025-03-27 RX ORDER — SODIUM CHLORIDE 0.9 % (FLUSH) 0.9 %
5-40 SYRINGE (ML) INJECTION PRN
Status: CANCELLED | OUTPATIENT
Start: 2025-03-27

## 2025-03-27 RX ORDER — SODIUM CHLORIDE 9 MG/ML
INJECTION, SOLUTION INTRAVENOUS PRN
Status: CANCELLED | OUTPATIENT
Start: 2025-03-27

## 2025-03-27 NOTE — H&P (VIEW-ONLY)
DIAGNOSIS:  1. (08/06/24) Higher risk for breast cancer  VANDANA 30% (17% per radiology)  2. Family history of breast cancer  * Mother/63  * Maternal aunt/39  3. Mother genetic testing negative  4. History of breast biopsy  5. History of abnormal SBE  * RIGHT IMF  6. (02/19/25) RIGHT (1:00-3) invasive ductal/lobular carcinoma  Grade 1  Clinical stage: [id number]  ER (95) NV (75) HER 2 (0)     IMAGING/PROCEDURES:  Patient deferring MRI re gadolinium  1. (07/24/23) WBUS: BIRADS-2  * scattered cysts  2. (01/19/24) BILATERAL st-mammogram: BIRADS-1  3. (07/30/24) WBUS: BIRADS-2  4. (02/05/25) BILATERAL st-mammogram: BIRADS-0  * RIGHT architectural distortion  5. (02/10/25) RIGHT dt-mammogram and US: BIRADS-4  * RIGHT persistent architectural distortion  * RIGHT (1:00-3) 4mm mass  6. (02/19/25) RIGHT US biopsy      HISTORY OF PRESENT ILLNESS  Chikis Green presents for surgical management of right breast cancer.    Chikis had been followed in the clinic for some time due to risk issues as well as difficult surveillance.  Recent imaging showed an irregularity in the upper inner aspect of the right breast.  Image guided biopsy confirmed the presence of an invasive carcinoma.    BREAST SYMPTOMS  Chikis has no symptoms to report.    PAST BREAST HISTORY  Chikis has undergone no prior biopsies nor has she had any breast surgeries.    PAST MEDICAL/SURGICAL HISTORY  Past Medical History:   Diagnosis Date    Breast cancer (HCC) 2025    right idc    Cancer (HCC)     Eye infection, right     Fibrocystic breast     Dr. Burgos--[facility]    Headache(784.0)     saw Dr. Chavez and several ENTs--now sees chiropractor and accupuncturist    Kidney stone     x2    Murmur      Past Surgical History:   Procedure Laterality Date    BREAST BIOPSY Right     benign    BREAST SURGERY  12/01/2011    Benign--needle bx    US BREAST BIOPSY W LOC DEVICE 1ST LESION RIGHT Right 2/19/2025    US BREAST BIOPSY W LOC DEVICE 1ST LESION RIGHT 2/19/2025 YONY ESPINOSA  [facility]       FAMILY HISTORY  The patient does have a family history of breast cancer.  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    MEDICATIONS    Current Outpatient Medications:     cephALEXin (KEFLEX) 500 MG capsule, Take 1 capsule by mouth 3 times daily, Disp: , Rfl:     Cholecalciferol (VITAMIN D) 50 MCG (2000 UT) CAPS capsule, Take by mouth, Disp: , Rfl:     b complex vitamins capsule, Take 1 capsule by mouth daily, Disp: , Rfl:     NONFORMULARY, Probiotic, Disp: , Rfl:     fluticasone (FLONASE) 50 MCG/ACT nasal spray, 1 spray by Each Nostril route daily, Disp: , Rfl:     ALLERGIES  Allergies   Allergen Reactions    Flagyl [Metronidazole] Other (See Comments)     Rapid heart rate    Seldane [Terfenadine] Anxiety     \"Hallucinations\"    Terazol [Terconazole] Rash    Tetracyclines & Related Diarrhea       SOCIAL HISTORY   reports that she quit smoking about 20 years ago. Her smoking use included cigarettes. She has never used smokeless tobacco. She reports current alcohol use. She reports that she does not use drugs.    SOCIAL HISTORY   reports that she quit smoking about 20 years ago. Her smoking use included cigarettes. She has never used smokeless tobacco. She reports current alcohol use. She reports that she does not use drugs.     REVIEW OF SYSTEMS  Chikis notes her health to be at baseline.  She exercises 5 days a week.  She notes no chest pain pedal edema or dyspnea on exertion.  She does experience palpitations.  She has no shortness of breath at rest or cough.  She has no abdominal pain reflux symptoms or change in bowel habits.  She has no hematuria or dysuria.  She does have chronic headaches.  She reports no dizziness.  She reports no numbness weakness or paresthesias.  She has no new or worsening bone or joint pain.     PERIOPERATIVE ISSUES  Chikis reports postoperative nausea and vomiting with limited anesthesia exposures.  She reports no procedurally related excessive

## 2025-03-27 NOTE — H&P
DIAGNOSIS:  1. (08/06/24) Higher risk for breast cancer  VANDANA 30% (17% per radiology)  2. Family history of breast cancer  * Mother/63  * Maternal aunt/39  3. Mother genetic testing negative  4. History of breast biopsy  5. History of abnormal SBE  * RIGHT IMF  6. (02/19/25) RIGHT (1:00-3) invasive ductal/lobular carcinoma  Grade 1  Clinical stage: [id number]  ER (95) TN (75) HER 2 (0)     IMAGING/PROCEDURES:  Patient deferring MRI re gadolinium  1. (07/24/23) WBUS: BIRADS-2  * scattered cysts  2. (01/19/24) BILATERAL st-mammogram: BIRADS-1  3. (07/30/24) WBUS: BIRADS-2  4. (02/05/25) BILATERAL st-mammogram: BIRADS-0  * RIGHT architectural distortion  5. (02/10/25) RIGHT dt-mammogram and US: BIRADS-4  * RIGHT persistent architectural distortion  * RIGHT (1:00-3) 4mm mass  6. (02/19/25) RIGHT US biopsy      HISTORY OF PRESENT ILLNESS  Chikis Green presents for surgical management of right breast cancer.    Chikis had been followed in the clinic for some time due to risk issues as well as difficult surveillance.  Recent imaging showed an irregularity in the upper inner aspect of the right breast.  Image guided biopsy confirmed the presence of an invasive carcinoma.    BREAST SYMPTOMS  Chikis has no symptoms to report.    PAST BREAST HISTORY  Chikis has undergone no prior biopsies nor has she had any breast surgeries.    PAST MEDICAL/SURGICAL HISTORY  Past Medical History:   Diagnosis Date    Breast cancer (HCC) 2025    right idc    Cancer (HCC)     Eye infection, right     Fibrocystic breast     Dr. Burgos--[facility]    Headache(784.0)     saw Dr. Chavez and several ENTs--now sees chiropractor and accupuncturist    Kidney stone     x2    Murmur      Past Surgical History:   Procedure Laterality Date    BREAST BIOPSY Right     benign    BREAST SURGERY  12/01/2011    Benign--needle bx    US BREAST BIOPSY W LOC DEVICE 1ST LESION RIGHT Right 2/19/2025    US BREAST BIOPSY W LOC DEVICE 1ST LESION RIGHT 2/19/2025 YONY ESPINOSA

## 2025-03-28 NOTE — PROGRESS NOTES
[facility]   PRE-ADMISSION TESTING GENERAL INSTRUCTIONS  PAT Phone Number: [phone]      GENERAL INSTRUCTIONS:    [x] Antibacterial Soap Shower Night before AND the Morning of procedure.  [x] Do not wear makeup, lotions, powders, deodorant the morning of surgery.  [x] No solid food after midnight. You may have SIPS of clear liquids up until 2 hours before your arrival time to the hospital.   [x] You may brush your teeth, gargle, but do not swallow water.   [x] No tobacco products, illegal drugs, or alcohol within 24 hours of your surgery.  [x] Jewelry or valuables should not be brought to the hospital. All body and/or tongue piercing's must be removed prior to arriving to hospital. No contact lens or hair pins.   [x] Arrange transportation with a responsible adult  to and from the hospital. Arrange for someone to be with you for the remainder of the day and for 24 hours after your procedure due to having had anesthesia.          -Who will be your  for transportation?          -Who will be staying with you for 24 hrs after your procedure?      [x] Bring insurance card and photo ID.  [] Bring copy of living will or healthcare power of  papers to be placed in your electronic record.  [x] Urine Pregnancy test will be preformed the day of surgery. A specimen sample may be brought from home.     PARKING INSTRUCTIONS:     [x] ARRIVAL DATE & TIME: 4/3  @  1100   [x] Times are subject to change. We will contact you the business day before surgery if that were to occur.  [x] Enter into the [facility] Entrance. Two people may accompany you. Masks are not required.  [x] Parking Lot \"I\" is where you will park. It is located on the corner of [address] and [address]. The entrance is on [address].   Only one vehicle - per patient, is permitted in parking lot.   Upon entering the parking lot, a voucher ticket will print.    EDUCATION  staff will make a sincere effort to keep you informed of delays. If any delays occur with your procedure, we apologize ahead of time for your inconvenience as we recognize the value of your time.

## 2025-03-31 NOTE — H&P
margins are clear then expander filling may begin with chest radiation therapy being deferred for several weeks to months thereafter. She is aware that post-radiotherapy filling is more uncomfortable as the irradiated/irritated skin becomes fibrotic, dry, and less easily stretched without discomfort. On the other hand if margins are good, expansion could take place until desired results achieved, then radiotherapy could be given. This is less uncomfortable for the patient.         We also discussed TRAM or Latissimus flap reconstruction. She understands that the abdomen may have bulging and she may have some functional deficit and weakness. She will have a \"tummy tuck\" type scar. If the Latissimus is used she may have functional deficit and weakness as well as a scar.     I also educated her on free flap reconstruction with JOVITA flaps. She would have to go to another institution that provides this service if she is interested in pursuing her options. She understands that these can be lengthy surgeries with higher anesthesia risks and higher liklihood of anastomotic complications.     I have informed the patient that no matter which option is performed, that symmetry and similar shape between each breast will be the goal. However, a reconstructed breast will never appear the same as a native breast and to expect sone differences between each breasts. There is a likleyhood for needing more than one surgery for revisions.     The patient was educated on the risks involving (Breast Implant Associated-Anaplastic Large Cell Lymphoma (URBANO-ALCL)).URBANO-ALCL is not a breast cancer, but a rare and treatable T-cell lymphoma that usually develops as a fluid swelling around breast implants.  The lifetime risk for this disease appears to be about 1 case for every 30,000 textured implants. Thus far, there have been no confirmed cases of URBANO-ALCL in women who have had only “smooth-surface” breast implants.  The FDA is not  were given. All of her questions were answered to her satisfaction and she agrees to proceed with the operation.      Attestation    No change in patient's H & P. I have reviewed the procedure with the patient as well as the risk and benefits. They have no further questions and agree to proceed with surgery.    Blood pressure 130/70, pulse 76, temperature 97.8 °F (36.6 °C), temperature source Temporal, resp. rate 18, height 1.626 m (5' 4.02\"), weight 56.7 kg (125 lb), last menstrual period 03/10/2025, SpO2 97%.      Morteza Rios MD   11:55 AM  4/3/2025

## 2025-04-01 ENCOUNTER — ANESTHESIA EVENT (OUTPATIENT)
Dept: OPERATING ROOM | Age: 50
End: 2025-04-01
Payer: COMMERCIAL

## 2025-04-02 ENCOUNTER — TELEPHONE (OUTPATIENT)
Dept: BREAST CENTER | Age: 50
End: 2025-04-02

## 2025-04-02 NOTE — TELEPHONE ENCOUNTER
Patient's spouse's FMLA paperwork has been completed and faxed to [facility] [employer].  Patient has been notified.

## 2025-04-02 NOTE — TELEPHONE ENCOUNTER
SPOKE WITH PATIENT:     Patient surgery has been scheduled 4/3/25 at 2:00pm / arrival 12pm / Nuclear injection 12pm in OR.    Surgery:    Joint case - Ulices / Rios    Right mastectomy - blue dye injection - right axillary sentinel lymph node biopsy - Left prophylactic mastectomy - bilateral closure (Cash) - General  2 setups / 2 plasma blades / blue dye / nuclear / neoprobe    Patient notified of date and time of surgery. Patient was instructed to enter the [facility] entrance of the hospital, park in parking lot I on the corner of [facility] [facility].  Patient was instructed NPO after midnight the night prior to surgery.  Patient was instructed NO ASA products or blood thinners 3-5 days prior to surgery.        Medical Clearance and EKG obtained    Preadmission testing will contact patient prior to surgery, to go over medications and any additional testing that may need to be completed.    Post op - 4/28/25 - 3:00pm [facility]    Authorization obtained - Insurance R    CPT Code - 33385    DX C50.911

## 2025-04-03 ENCOUNTER — HOSPITAL ENCOUNTER (OUTPATIENT)
Age: 50
Setting detail: OBSERVATION
Discharge: HOME | End: 2025-04-04
Attending: SURGERY | Admitting: SURGERY
Payer: COMMERCIAL

## 2025-04-03 ENCOUNTER — ANESTHESIA (OUTPATIENT)
Dept: OPERATING ROOM | Age: 50
End: 2025-04-03
Payer: COMMERCIAL

## 2025-04-03 ENCOUNTER — HOSPITAL ENCOUNTER (OUTPATIENT)
Dept: NUCLEAR MEDICINE | Age: 50
Discharge: HOME | End: 2025-04-05
Payer: COMMERCIAL

## 2025-04-03 DIAGNOSIS — Z01.812 PRE-OPERATIVE LABORATORY EXAMINATION: Primary | ICD-10-CM

## 2025-04-03 DIAGNOSIS — C50.911 INVASIVE DUCTAL CARCINOMA OF RIGHT BREAST: ICD-10-CM

## 2025-04-03 DIAGNOSIS — C50.911 MALIGNANT NEOPLASM OF RIGHT BREAST: ICD-10-CM

## 2025-04-03 PROBLEM — C50.919 BREAST CANCER IN FEMALE: Status: ACTIVE | Noted: 2025-04-03

## 2025-04-03 LAB
HCG, URINE, POC: NEGATIVE
Lab: NORMAL
NEGATIVE QC PASS/FAIL: NORMAL
POSITIVE QC PASS/FAIL: NORMAL

## 2025-04-03 PROCEDURE — 6360000002 HC RX W HCPCS: Performed by: SURGERY

## 2025-04-03 PROCEDURE — G0378 HOSPITAL OBSERVATION PER HR: HCPCS

## 2025-04-03 PROCEDURE — 19303 MAST SIMPLE COMPLETE: CPT | Performed by: PLASTIC SURGERY

## 2025-04-03 PROCEDURE — 38525 BIOPSY/REMOVAL LYMPH NODES: CPT | Performed by: SURGERY

## 2025-04-03 PROCEDURE — 6370000000 HC RX 637 (ALT 250 FOR IP): Performed by: STUDENT IN AN ORGANIZED HEALTH CARE EDUCATION/TRAINING PROGRAM

## 2025-04-03 PROCEDURE — 38900 IO MAP OF SENT LYMPH NODE: CPT | Performed by: SURGERY

## 2025-04-03 PROCEDURE — 2580000003 HC RX 258: Performed by: ANESTHESIOLOGY

## 2025-04-03 PROCEDURE — 12035 INTMD RPR S/A/T/EXT 12.6-20: CPT | Performed by: PLASTIC SURGERY

## 2025-04-03 PROCEDURE — A9520 TC99 TILMANOCEPT DIAG 0.5MCI: HCPCS | Performed by: RADIOLOGY

## 2025-04-03 PROCEDURE — 3600000014 HC SURGERY LEVEL 4 ADDTL 15MIN: Performed by: SURGERY

## 2025-04-03 PROCEDURE — 2580000003 HC RX 258: Performed by: SURGERY

## 2025-04-03 PROCEDURE — 6360000002 HC RX W HCPCS: Performed by: ANESTHESIOLOGY

## 2025-04-03 PROCEDURE — 2500000003 HC RX 250 WO HCPCS

## 2025-04-03 PROCEDURE — 38792 RA TRACER ID OF SENTINL NODE: CPT

## 2025-04-03 PROCEDURE — 2580000003 HC RX 258: Performed by: NURSE ANESTHETIST, CERTIFIED REGISTERED

## 2025-04-03 PROCEDURE — 3600000004 HC SURGERY LEVEL 4 BASE: Performed by: SURGERY

## 2025-04-03 PROCEDURE — 3700000001 HC ADD 15 MINUTES (ANESTHESIA): Performed by: SURGERY

## 2025-04-03 PROCEDURE — 3430000000 HC RX DIAGNOSTIC RADIOPHARMACEUTICAL: Performed by: RADIOLOGY

## 2025-04-03 PROCEDURE — 6360000002 HC RX W HCPCS: Performed by: PLASTIC SURGERY

## 2025-04-03 PROCEDURE — 2500000003 HC RX 250 WO HCPCS: Performed by: SURGERY

## 2025-04-03 PROCEDURE — 2580000003 HC RX 258

## 2025-04-03 PROCEDURE — 2500000003 HC RX 250 WO HCPCS: Performed by: STUDENT IN AN ORGANIZED HEALTH CARE EDUCATION/TRAINING PROGRAM

## 2025-04-03 PROCEDURE — 3700000000 HC ANESTHESIA ATTENDED CARE: Performed by: SURGERY

## 2025-04-03 PROCEDURE — 7100000000 HC PACU RECOVERY - FIRST 15 MIN: Performed by: SURGERY

## 2025-04-03 PROCEDURE — 6360000002 HC RX W HCPCS: Performed by: STUDENT IN AN ORGANIZED HEALTH CARE EDUCATION/TRAINING PROGRAM

## 2025-04-03 PROCEDURE — 2709999900 HC NON-CHARGEABLE SUPPLY: Performed by: SURGERY

## 2025-04-03 PROCEDURE — 6360000002 HC RX W HCPCS

## 2025-04-03 PROCEDURE — 2500000003 HC RX 250 WO HCPCS: Performed by: PLASTIC SURGERY

## 2025-04-03 PROCEDURE — 64488 TAP BLOCK BI INJECTION: CPT | Performed by: ANESTHESIOLOGY

## 2025-04-03 PROCEDURE — 7100000001 HC PACU RECOVERY - ADDTL 15 MIN: Performed by: SURGERY

## 2025-04-03 PROCEDURE — 2720000010 HC SURG SUPPLY STERILE: Performed by: SURGERY

## 2025-04-03 PROCEDURE — 19303 MAST SIMPLE COMPLETE: CPT | Performed by: SURGERY

## 2025-04-03 RX ORDER — FENTANYL CITRATE 50 UG/ML
INJECTION, SOLUTION INTRAMUSCULAR; INTRAVENOUS
Status: COMPLETED
Start: 2025-04-03 | End: 2025-04-03

## 2025-04-03 RX ORDER — SODIUM CHLORIDE 0.9 % (FLUSH) 0.9 %
5-40 SYRINGE (ML) INJECTION PRN
Status: DISCONTINUED | OUTPATIENT
Start: 2025-04-03 | End: 2025-04-03 | Stop reason: HOSPADM

## 2025-04-03 RX ORDER — SODIUM CHLORIDE 9 MG/ML
INJECTION, SOLUTION INTRAVENOUS PRN
Status: DISCONTINUED | OUTPATIENT
Start: 2025-04-03 | End: 2025-04-04 | Stop reason: HOSPADM

## 2025-04-03 RX ORDER — OXYCODONE AND ACETAMINOPHEN 5; 325 MG/1; MG/1
1 TABLET ORAL EVERY 6 HOURS PRN
Qty: 10 TABLET | Refills: 0 | Status: SHIPPED | OUTPATIENT
Start: 2025-04-03 | End: 2025-04-04 | Stop reason: HOSPADM

## 2025-04-03 RX ORDER — SODIUM CHLORIDE 0.9 % (FLUSH) 0.9 %
5-40 SYRINGE (ML) INJECTION PRN
Status: DISCONTINUED | OUTPATIENT
Start: 2025-04-03 | End: 2025-04-04 | Stop reason: HOSPADM

## 2025-04-03 RX ORDER — SODIUM CHLORIDE 9 MG/ML
INJECTION, SOLUTION INTRAVENOUS PRN
Status: DISCONTINUED | OUTPATIENT
Start: 2025-04-03 | End: 2025-04-03 | Stop reason: HOSPADM

## 2025-04-03 RX ORDER — ROPIVACAINE HYDROCHLORIDE 5 MG/ML
30 INJECTION, SOLUTION EPIDURAL; INFILTRATION; PERINEURAL
Status: DISCONTINUED | OUTPATIENT
Start: 2025-04-03 | End: 2025-04-03 | Stop reason: HOSPADM

## 2025-04-03 RX ORDER — FENTANYL CITRATE 50 UG/ML
INJECTION, SOLUTION INTRAMUSCULAR; INTRAVENOUS
Status: DISCONTINUED | OUTPATIENT
Start: 2025-04-03 | End: 2025-04-03 | Stop reason: SDUPTHER

## 2025-04-03 RX ORDER — NALOXONE HYDROCHLORIDE 0.4 MG/ML
INJECTION, SOLUTION INTRAMUSCULAR; INTRAVENOUS; SUBCUTANEOUS PRN
Status: DISCONTINUED | OUTPATIENT
Start: 2025-04-03 | End: 2025-04-03 | Stop reason: HOSPADM

## 2025-04-03 RX ORDER — MIDAZOLAM HYDROCHLORIDE 1 MG/ML
INJECTION, SOLUTION INTRAMUSCULAR; INTRAVENOUS
Status: DISCONTINUED | OUTPATIENT
Start: 2025-04-03 | End: 2025-04-03 | Stop reason: SDUPTHER

## 2025-04-03 RX ORDER — SODIUM CHLORIDE, SODIUM LACTATE, POTASSIUM CHLORIDE, CALCIUM CHLORIDE 600; 310; 30; 20 MG/100ML; MG/100ML; MG/100ML; MG/100ML
INJECTION, SOLUTION INTRAVENOUS CONTINUOUS
Status: DISCONTINUED | OUTPATIENT
Start: 2025-04-03 | End: 2025-04-03 | Stop reason: HOSPADM

## 2025-04-03 RX ORDER — SODIUM CHLORIDE, SODIUM LACTATE, POTASSIUM CHLORIDE, CALCIUM CHLORIDE 600; 310; 30; 20 MG/100ML; MG/100ML; MG/100ML; MG/100ML
INJECTION, SOLUTION INTRAVENOUS CONTINUOUS
Status: DISCONTINUED | OUTPATIENT
Start: 2025-04-03 | End: 2025-04-04 | Stop reason: HOSPADM

## 2025-04-03 RX ORDER — DEXAMETHASONE SODIUM PHOSPHATE 10 MG/ML
INJECTION, SOLUTION INTRA-ARTICULAR; INTRALESIONAL; INTRAMUSCULAR; INTRAVENOUS; SOFT TISSUE
Status: DISCONTINUED | OUTPATIENT
Start: 2025-04-03 | End: 2025-04-03 | Stop reason: SDUPTHER

## 2025-04-03 RX ORDER — OXYCODONE HYDROCHLORIDE 5 MG/1
5 TABLET ORAL EVERY 4 HOURS PRN
Status: DISCONTINUED | OUTPATIENT
Start: 2025-04-03 | End: 2025-04-04 | Stop reason: HOSPADM

## 2025-04-03 RX ORDER — ONDANSETRON 4 MG/1
4 TABLET, ORALLY DISINTEGRATING ORAL EVERY 8 HOURS PRN
Status: DISCONTINUED | OUTPATIENT
Start: 2025-04-03 | End: 2025-04-04 | Stop reason: HOSPADM

## 2025-04-03 RX ORDER — SODIUM CHLORIDE, SODIUM LACTATE, POTASSIUM CHLORIDE, CALCIUM CHLORIDE 600; 310; 30; 20 MG/100ML; MG/100ML; MG/100ML; MG/100ML
INJECTION, SOLUTION INTRAVENOUS
Status: DISCONTINUED | OUTPATIENT
Start: 2025-04-03 | End: 2025-04-03 | Stop reason: SDUPTHER

## 2025-04-03 RX ORDER — POLYETHYLENE GLYCOL 3350 17 G/17G
17 POWDER, FOR SOLUTION ORAL DAILY
Status: DISCONTINUED | OUTPATIENT
Start: 2025-04-03 | End: 2025-04-04 | Stop reason: HOSPADM

## 2025-04-03 RX ORDER — LIDOCAINE HYDROCHLORIDE 20 MG/ML
INJECTION, SOLUTION INTRAVENOUS
Status: DISCONTINUED | OUTPATIENT
Start: 2025-04-03 | End: 2025-04-03 | Stop reason: SDUPTHER

## 2025-04-03 RX ORDER — SODIUM CHLORIDE 0.9 % (FLUSH) 0.9 %
5-40 SYRINGE (ML) INJECTION EVERY 12 HOURS SCHEDULED
Status: DISCONTINUED | OUTPATIENT
Start: 2025-04-03 | End: 2025-04-03 | Stop reason: HOSPADM

## 2025-04-03 RX ORDER — SODIUM CHLORIDE 0.9 % (FLUSH) 0.9 %
5-40 SYRINGE (ML) INJECTION EVERY 12 HOURS SCHEDULED
Status: DISCONTINUED | OUTPATIENT
Start: 2025-04-03 | End: 2025-04-04 | Stop reason: HOSPADM

## 2025-04-03 RX ORDER — MIDAZOLAM HYDROCHLORIDE 2 MG/2ML
0.5 INJECTION, SOLUTION INTRAMUSCULAR; INTRAVENOUS PRN
Status: DISCONTINUED | OUTPATIENT
Start: 2025-04-03 | End: 2025-04-03 | Stop reason: HOSPADM

## 2025-04-03 RX ORDER — ACETAMINOPHEN 325 MG/1
650 TABLET ORAL EVERY 6 HOURS SCHEDULED
Status: DISCONTINUED | OUTPATIENT
Start: 2025-04-03 | End: 2025-04-04 | Stop reason: HOSPADM

## 2025-04-03 RX ORDER — FENTANYL CITRATE 50 UG/ML
25 INJECTION, SOLUTION INTRAMUSCULAR; INTRAVENOUS PRN
Status: DISCONTINUED | OUTPATIENT
Start: 2025-04-03 | End: 2025-04-03 | Stop reason: HOSPADM

## 2025-04-03 RX ORDER — ONDANSETRON 2 MG/ML
4 INJECTION INTRAMUSCULAR; INTRAVENOUS EVERY 6 HOURS PRN
Status: DISCONTINUED | OUTPATIENT
Start: 2025-04-03 | End: 2025-04-04 | Stop reason: HOSPADM

## 2025-04-03 RX ORDER — PROPOFOL 10 MG/ML
INJECTION, EMULSION INTRAVENOUS
Status: DISCONTINUED | OUTPATIENT
Start: 2025-04-03 | End: 2025-04-03 | Stop reason: SDUPTHER

## 2025-04-03 RX ORDER — CEPHALEXIN 500 MG/1
500 CAPSULE ORAL EVERY 8 HOURS SCHEDULED
Status: DISCONTINUED | OUTPATIENT
Start: 2025-04-03 | End: 2025-04-04 | Stop reason: HOSPADM

## 2025-04-03 RX ORDER — METHYLENE BLUE 10 MG/ML
INJECTION INTRAVENOUS PRN
Status: DISCONTINUED | OUTPATIENT
Start: 2025-04-03 | End: 2025-04-03 | Stop reason: ALTCHOICE

## 2025-04-03 RX ORDER — ONDANSETRON 2 MG/ML
INJECTION INTRAMUSCULAR; INTRAVENOUS
Status: DISCONTINUED | OUTPATIENT
Start: 2025-04-03 | End: 2025-04-03 | Stop reason: SDUPTHER

## 2025-04-03 RX ORDER — OXYCODONE HYDROCHLORIDE 10 MG/1
10 TABLET ORAL EVERY 4 HOURS PRN
Status: DISCONTINUED | OUTPATIENT
Start: 2025-04-03 | End: 2025-04-04 | Stop reason: HOSPADM

## 2025-04-03 RX ORDER — ENOXAPARIN SODIUM 100 MG/ML
40 INJECTION SUBCUTANEOUS DAILY
Status: DISCONTINUED | OUTPATIENT
Start: 2025-04-04 | End: 2025-04-04 | Stop reason: HOSPADM

## 2025-04-03 RX ORDER — SODIUM CHLORIDE 9 MG/ML
INJECTION, SOLUTION INTRAMUSCULAR; INTRAVENOUS; SUBCUTANEOUS
Status: DISCONTINUED
Start: 2025-04-03 | End: 2025-04-03

## 2025-04-03 RX ORDER — CEPHALEXIN 500 MG/1
500 CAPSULE ORAL EVERY 8 HOURS SCHEDULED
Qty: 21 CAPSULE | Refills: 0 | Status: SHIPPED | OUTPATIENT
Start: 2025-04-04 | End: 2025-04-11

## 2025-04-03 RX ORDER — ONDANSETRON 2 MG/ML
4 INJECTION INTRAMUSCULAR; INTRAVENOUS
Status: DISCONTINUED | OUTPATIENT
Start: 2025-04-03 | End: 2025-04-03 | Stop reason: HOSPADM

## 2025-04-03 RX ORDER — FENTANYL CITRATE 50 UG/ML
50 INJECTION, SOLUTION INTRAMUSCULAR; INTRAVENOUS EVERY 5 MIN PRN
Status: COMPLETED | OUTPATIENT
Start: 2025-04-03 | End: 2025-04-03

## 2025-04-03 RX ORDER — ENOXAPARIN SODIUM 100 MG/ML
40 INJECTION SUBCUTANEOUS DAILY
Status: DISCONTINUED | OUTPATIENT
Start: 2025-04-03 | End: 2025-04-03

## 2025-04-03 RX ORDER — ROCURONIUM BROMIDE 10 MG/ML
INJECTION, SOLUTION INTRAVENOUS
Status: DISCONTINUED | OUTPATIENT
Start: 2025-04-03 | End: 2025-04-03 | Stop reason: SDUPTHER

## 2025-04-03 RX ORDER — SODIUM CHLORIDE 9 MG/ML
INJECTION, SOLUTION INTRAVENOUS
Status: DISCONTINUED | OUTPATIENT
Start: 2025-04-03 | End: 2025-04-03 | Stop reason: SDUPTHER

## 2025-04-03 RX ORDER — FENTANYL CITRATE 50 UG/ML
25 INJECTION, SOLUTION INTRAMUSCULAR; INTRAVENOUS EVERY 5 MIN PRN
Status: DISCONTINUED | OUTPATIENT
Start: 2025-04-03 | End: 2025-04-03 | Stop reason: HOSPADM

## 2025-04-03 RX ADMIN — MIDAZOLAM 2 MG: 1 INJECTION INTRAMUSCULAR; INTRAVENOUS at 13:17

## 2025-04-03 RX ADMIN — OXYCODONE HYDROCHLORIDE 10 MG: 10 TABLET ORAL at 22:48

## 2025-04-03 RX ADMIN — ONDANSETRON 4 MG: 2 INJECTION, SOLUTION INTRAMUSCULAR; INTRAVENOUS at 15:19

## 2025-04-03 RX ADMIN — ROCURONIUM BROMIDE 20 MG: 50 INJECTION INTRAVENOUS at 14:52

## 2025-04-03 RX ADMIN — ROCURONIUM BROMIDE 50 MG: 50 INJECTION INTRAVENOUS at 13:23

## 2025-04-03 RX ADMIN — MIDAZOLAM HYDROCHLORIDE 2 MG: 1 INJECTION, SOLUTION INTRAMUSCULAR; INTRAVENOUS at 12:51

## 2025-04-03 RX ADMIN — ROCURONIUM BROMIDE 10 MG: 50 INJECTION INTRAVENOUS at 14:23

## 2025-04-03 RX ADMIN — FENTANYL CITRATE 100 MCG: 50 INJECTION INTRAMUSCULAR; INTRAVENOUS at 12:51

## 2025-04-03 RX ADMIN — ACETAMINOPHEN 650 MG: 325 TABLET ORAL at 22:48

## 2025-04-03 RX ADMIN — ACETAMINOPHEN 650 MG: 325 TABLET ORAL at 18:58

## 2025-04-03 RX ADMIN — TILMANOCEPT 1 MILLICURIE: KIT at 13:44

## 2025-04-03 RX ADMIN — ROPIVACAINE HYDROCHLORIDE 60 ML: 5 INJECTION, SOLUTION EPIDURAL; INFILTRATION; PERINEURAL at 12:50

## 2025-04-03 RX ADMIN — FENTANYL CITRATE 25 MCG: 50 INJECTION INTRAMUSCULAR; INTRAVENOUS at 17:11

## 2025-04-03 RX ADMIN — ONDANSETRON 4 MG: 2 INJECTION, SOLUTION INTRAMUSCULAR; INTRAVENOUS at 22:45

## 2025-04-03 RX ADMIN — FENTANYL CITRATE 50 MCG: 0.05 INJECTION, SOLUTION INTRAMUSCULAR; INTRAVENOUS at 15:43

## 2025-04-03 RX ADMIN — DEXAMETHASONE SODIUM PHOSPHATE 10 MG: 10 INJECTION INTRAMUSCULAR; INTRAVENOUS at 13:32

## 2025-04-03 RX ADMIN — FENTANYL CITRATE 50 MCG: 50 INJECTION INTRAMUSCULAR; INTRAVENOUS at 16:37

## 2025-04-03 RX ADMIN — CEFAZOLIN 2000 MG: 2 INJECTION, POWDER, FOR SOLUTION INTRAMUSCULAR; INTRAVENOUS at 13:31

## 2025-04-03 RX ADMIN — FENTANYL CITRATE 50 MCG: 50 INJECTION INTRAMUSCULAR; INTRAVENOUS at 16:53

## 2025-04-03 RX ADMIN — SODIUM CHLORIDE: 9 INJECTION, SOLUTION INTRAVENOUS at 12:30

## 2025-04-03 RX ADMIN — FENTANYL CITRATE 50 MCG: 0.05 INJECTION, SOLUTION INTRAMUSCULAR; INTRAVENOUS at 13:21

## 2025-04-03 RX ADMIN — FENTANYL CITRATE 25 MCG: 50 INJECTION INTRAMUSCULAR; INTRAVENOUS at 17:18

## 2025-04-03 RX ADMIN — CEPHALEXIN 500 MG: 500 CAPSULE ORAL at 20:21

## 2025-04-03 RX ADMIN — OXYCODONE HYDROCHLORIDE 10 MG: 10 TABLET ORAL at 18:58

## 2025-04-03 RX ADMIN — POLYETHYLENE GLYCOL 3350 17 G: 17 POWDER, FOR SOLUTION ORAL at 19:00

## 2025-04-03 RX ADMIN — SODIUM CHLORIDE, POTASSIUM CHLORIDE, SODIUM LACTATE AND CALCIUM CHLORIDE: 600; 310; 30; 20 INJECTION, SOLUTION INTRAVENOUS at 14:26

## 2025-04-03 RX ADMIN — PROPOFOL 150 MG: 10 INJECTION, EMULSION INTRAVENOUS at 13:21

## 2025-04-03 RX ADMIN — FENTANYL CITRATE 100 MCG: 0.05 INJECTION, SOLUTION INTRAMUSCULAR; INTRAVENOUS at 14:37

## 2025-04-03 RX ADMIN — LIDOCAINE HYDROCHLORIDE 40 MG: 20 INJECTION, SOLUTION INTRAVENOUS at 13:21

## 2025-04-03 RX ADMIN — FENTANYL CITRATE 50 MCG: 0.05 INJECTION, SOLUTION INTRAMUSCULAR; INTRAVENOUS at 14:09

## 2025-04-03 RX ADMIN — SODIUM CHLORIDE, SODIUM LACTATE, POTASSIUM CHLORIDE, AND CALCIUM CHLORIDE: .6; .31; .03; .02 INJECTION, SOLUTION INTRAVENOUS at 19:00

## 2025-04-03 RX ADMIN — SODIUM CHLORIDE, PRESERVATIVE FREE 10 ML: 5 INJECTION INTRAVENOUS at 20:23

## 2025-04-03 RX ADMIN — SUGAMMADEX 200 MG: 100 INJECTION, SOLUTION INTRAVENOUS at 16:01

## 2025-04-03 ASSESSMENT — PAIN DESCRIPTION - LOCATION
LOCATION: BREAST
LOCATION: CHEST
LOCATION: BREAST

## 2025-04-03 ASSESSMENT — PAIN SCALES - GENERAL
PAINLEVEL_OUTOF10: 6
PAINLEVEL_OUTOF10: 0
PAINLEVEL_OUTOF10: 9
PAINLEVEL_OUTOF10: 2
PAINLEVEL_OUTOF10: 0
PAINLEVEL_OUTOF10: 6
PAINLEVEL_OUTOF10: 8
PAINLEVEL_OUTOF10: 5
PAINLEVEL_OUTOF10: 6
PAINLEVEL_OUTOF10: 7
PAINLEVEL_OUTOF10: 10

## 2025-04-03 ASSESSMENT — PAIN DESCRIPTION - ORIENTATION
ORIENTATION: RIGHT
ORIENTATION: RIGHT
ORIENTATION: LEFT;RIGHT
ORIENTATION: RIGHT
ORIENTATION: RIGHT;LEFT
ORIENTATION: RIGHT

## 2025-04-03 ASSESSMENT — PAIN DESCRIPTION - DESCRIPTORS
DESCRIPTORS: SHARP;SORE;THROBBING
DESCRIPTORS: ACHING;CRAMPING;DISCOMFORT
DESCRIPTORS: ACHING;BURNING;DISCOMFORT
DESCRIPTORS: ACHING;CRAMPING;DISCOMFORT
DESCRIPTORS: ACHING;BURNING;DISCOMFORT
DESCRIPTORS: ACHING;BURNING;DISCOMFORT
DESCRIPTORS: TENDER;THROBBING;TIGHTNESS
DESCRIPTORS: ACHING;CRAMPING;DISCOMFORT

## 2025-04-03 ASSESSMENT — PAIN - FUNCTIONAL ASSESSMENT: PAIN_FUNCTIONAL_ASSESSMENT: ACTIVITIES ARE NOT PREVENTED

## 2025-04-03 NOTE — ANESTHESIA POSTPROCEDURE EVALUATION
Department of Anesthesiology  Postprocedure Note    Patient: Chikis Green  MRN: [id number]  YOB: 1975  Date of evaluation: 4/3/2025    Procedure Summary       Date: 04/03/25 Room / Location: [address]    Anesthesia Start: 1315 Anesthesia Stop: 1620    Procedures:       RIGHT simple mastectomy - blue dye injection - right axillary sentinel lymph node biopsy blue dye / nuclear  /neoprobe (Right: Breast)      left prophylactic mastectomy - closure right mastectomy defect (Left: Breast) Diagnosis:       Malignant neoplasm of right breast      (Malignant neoplasm of right breast [C50.911])    Surgeons: Amandeep Elena MD; Morteza Rios MD Responsible Provider: Fahad Herrera MD    Anesthesia Type: general, regional ASA Status: Not recorded            Anesthesia Type: No value filed.    Sandy Phase I: Sandy Score: 10    Sandy Phase II:      Anesthesia Post Evaluation    Patient location during evaluation: PACU  Patient participation: complete - patient participated  Level of consciousness: awake and alert  Pain score: 2  Airway patency: patent  Nausea & Vomiting: no nausea and no vomiting  Cardiovascular status: blood pressure returned to baseline  Respiratory status: acceptable  Hydration status: euvolemic  Pain management: adequate    No notable events documented.

## 2025-04-03 NOTE — PROGRESS NOTES
4 Eyes Skin Assessment     NAME:  Chikis Green  YOB: 1975  MEDICAL RECORD NUMBER:  [id number]    The patient is being assessed for  Admission    I agree that at least one RN has performed a thorough Head to Toe Skin Assessment on the patient. ALL assessment sites listed below have been assessed.      Areas assessed by both nurses:    Head, Face, Ears, Shoulders, Back, Chest, Arms, Elbows, Hands, Sacrum. Buttock, Coccyx, Ischium, Legs. Feet and Heels, and Under Medical Devices         Does the Patient have a Wound? No noted wound(s)       -S/P bilateral mastectomy, bilateral ZACHERY drains intact, bloody output    Miquel Prevention initiated by RN: No  Wound Care Orders initiated by RN: No    Pressure Injury (Stage 3,4, Unstageable, DTI, NWPT, and Complex wounds) if present, place Wound referral order by RN under : No    New Ostomies, if present place, Ostomy referral order under : No     Nurse 1 eSignature: Electronically signed by Cynthia Obrien RN on 4/3/25 at 7:03 PM EDT    **SHARE this note so that the co-signing nurse can place an eSignature**    Nurse 2 eSignature: Electronically signed by Dana Castillo RN on 4/3/25 at 7:09 PM EDT

## 2025-04-03 NOTE — DISCHARGE INSTRUCTIONS
SURGERY DISCHARGE INSTRUCTIONS    You may be drowsy or lightheaded after receiving sedation or anesthesia.    A responsible person should be with you for the next 24 hours.  Please follow the instructions checked below:    FOLLOW UP:  Follow up in the office.     DIET: Advance your diet as tolerated. Start with light diet and progress to your normal diet as you feel like eating. If you experience nausea or repeated episodes of vomiting which persist beyond 12-24 hours, notify your doctor.    ACTIVITY: Rest today. Increase activity gradually. No driving for 1 day. No driving while on prescription pain medication.    SHOWER/BATHING: Okay to shower in 48 hours after procedure.    WOUND CARE: You have a clean dressing applied. You may remove this dressing in 24 - 36 hours. Please leave steri-strips on, they will fall off on their own. You do not need a new dressing but may apply one if your feel that your incisions are oozing. Keep incisions clean and dry. Always ensure you and your care giver clean hands before and after caring for the wound. You may place ice on incisions to decrease the pain and bruising. Wear tight, supportive bra at all times, remove only to shower and then replace bra.    MEDICATIONS: Take as prescribed. You may take over the counter ibuprofen or tylenol for pain as directed, limit total amount of acetaminophen (tylenol) to 3 grams per 24-hour period. Okay to resume anticoagulant medication after 24hrs. You may experience constipation while taking pain medication, you may take over the counter stool softeners (Docusate/Colace or Senokot-S) as directed.     DRAINS: Leave drains in until seen in office for follow-up.  Place clean, dry dressing around drain site, change daily or as needed. Empty drain bulbs as needed.  Record daily output and bring log in with you to your follow-up appointment. Keep drain in place until output in less than 50cc daily or until 10 days post-op, call [facility] at that time.      SPECIAL INSTRUCTIONS:   Call physician if they or any other problems occur:  Call the office if you have a fever over >101F, or if your incision becomes red, tender, or drains more than a small amount of clear fluid  Fever over 101°    Redness, swelling, hardness or warmth at the operative site  Unrelieved nausea    Foul smelling or cloudy drainage at the operative site   Unrelieved pain    Blood soaked dressing. (Some oozing may be normal)

## 2025-04-03 NOTE — H&P
vomiting with limited anesthesia exposures.  She reports no procedurally related excessive bleeding.  She reports no history of blood clots.     PHYSICAL EXAMINATION  /60 (BP Site: Right Upper Arm)   Pulse 63   Temp 98 °F (36.7 °C)   Resp 20   Ht 1.626 m (5' 4\")   Wt 56.2 kg (124 lb)   SpO2 100%   BMI 21.28 kg/m²   The patient is pleasant and in no distress  Auscultation of the heart demonstrates a regular rate and rhythm without murmurs  Breath sounds clear  No edema in the extremities  No gross neurologic deficits     COMPREHENSIVE BREAST EXAMINATION  There are no cervical, supraclavicular, or infraclavicular lymph nodes palpable.     Inspection of the breast bilaterally demonstrate there to be no secondary signs of malignancy.  There are no lesions of the nipple areola at the side.  No spontaneous discharges witnessed.     Palpation of the axilla bilaterally is without adenopathy.     Palpation of the breast demonstrates no masses.     BREAST IMAGING STUDIES  I did review the recent imaging studies.  The key finding is on the mammogram.  There is noted to be a small area of architectural distortion.  This correlates with a small 4 mm sonographic mass.  Interestingly, the MRI shows no residual enhancement at the site and no other findings.     PATHOLOGY  Review the pathology report confirms the presence of an invasive ductal lobular carcinoma.    ASSESSMENT AND PLAN  Chikis JADE Peter presents for surgical management of right breast cancer.    Any further recommendations will be based on the upcoming surgical pathology report.      This note was created with voice recognition software.  Please excuse any grammatical errors that were not corrected and please contact me if there are any questions.    [email or web address]  [phone]

## 2025-04-03 NOTE — ANESTHESIA PROCEDURE NOTES
Peripheral Block    Patient location during procedure: procedure area  Reason for block: post-op pain management  Start time: 4/3/2025 12:50 PM  End time: 4/3/2025 1:00 PM  Staffing  Performed: anesthesiologist   Anesthesiologist: Fahad Herrera MD  Performed by: Fahad Herrera MD  Authorized by: Fahad Herrera MD    Preanesthetic Checklist  Completed: patient identified, IV checked, site marked, risks and benefits discussed, surgical/procedural consents, equipment checked, pre-op evaluation, timeout performed, anesthesia consent given, oxygen available and monitors applied/VS acknowledged  Peripheral Block   Patient position: supine  Prep: ChloraPrep and site prepped and draped  Provider prep: mask and sterile gloves  Patient monitoring: continuous pulse ox, cardiac monitor, continuous capnometry, frequent blood pressure checks, IV access, oxygen and responsive to questions  Block type: TAP, PECS I and PECS II  Laterality: bilateral  Injection technique: single-shot  Guidance: ultrasound guided  Local infiltration: ropivacaine  Local infiltration: ropivacaine    Needle   Needle type: insulated echogenic nerve stimulator needle   Needle gauge: 20 G  Needle localization: ultrasound guidance  Needle insertion depth: 3 cm  Needle length: 5 cm  Assessment   Injection assessment: negative aspiration for heme, no paresthesia on injection, local visualized surrounding nerve on ultrasound and no intravascular symptoms  Slow fractionated injection: yes  Hemodynamics: stable  Outcomes: uncomplicated and patient tolerated procedure well    Medications Administered  ROPivacaine (NAROPIN) 0.25% in sodium chloride (Mixture components: ROPivacaine 0.5% Soln, 10 mL; sodium chloride 0.9 % Soln, 10 mL) - Perineural   60 mL - 4/3/2025 12:50:00 PM

## 2025-04-03 NOTE — ANESTHESIA PRE PROCEDURE
04/03/25 1115   BP:  130/70   Pulse:  76   Resp:  18   Temp:  97.8 °F (36.6 °C)   TempSrc:  Temporal   SpO2:  97%   Weight: 56.7 kg (125 lb) 56.7 kg (125 lb)   Height: 1.626 m (5' 4\") 1.626 m (5' 4.02\")                                              BP Readings from Last 3 Encounters:   04/03/25 130/70   03/13/25 109/64   03/11/25 112/60       NPO Status: Time of last liquid consumption: 0900                        Time of last solid consumption: 2100                        Date of last liquid consumption: 04/03/25 (SIPS OF BLACK COFFEE)                        Date of last solid food consumption: 04/02/25    BMI:   Wt Readings from Last 3 Encounters:   04/03/25 56.7 kg (125 lb)   03/13/25 56.7 kg (125 lb)   03/11/25 56.2 kg (124 lb)     Body mass index is 21.45 kg/m².    CBC:   Lab Results   Component Value Date/Time    WBC 5.1 03/11/2025 10:20 AM    RBC 4.33 03/11/2025 10:20 AM    HGB 13.8 03/11/2025 10:20 AM    HCT 41.6 03/11/2025 10:20 AM    MCV 96.1 03/11/2025 10:20 AM    RDW 12.2 03/11/2025 10:20 AM     03/11/2025 10:20 AM       CMP:   Lab Results   Component Value Date/Time     03/11/2025 10:20 AM    K 4.1 03/11/2025 10:20 AM     03/11/2025 10:20 AM    CO2 20 03/11/2025 10:20 AM    BUN 13 03/11/2025 10:20 AM    CREATININE 0.9 03/11/2025 10:20 AM    GFRAA >60 04/28/2022 12:33 PM    LABGLOM 84 03/11/2025 10:20 AM    GLUCOSE 92 03/11/2025 10:20 AM    GLUCOSE 130 03/28/2012 04:33 AM    CALCIUM 9.3 03/11/2025 10:20 AM    BILITOT 0.4 03/11/2025 10:20 AM    ALKPHOS 41 03/11/2025 10:20 AM    AST 18 03/11/2025 10:20 AM    ALT 9 03/11/2025 10:20 AM       POC Tests: No results for input(s): \"POCGLU\", \"POCNA\", \"POCK\", \"POCCL\", \"POCBUN\", \"POCHEMO\", \"POCHCT\" in the last 72 hours.    Coags:   Lab Results   Component Value Date/Time    PROTIME 12.1 03/28/2012 04:33 AM    INR 1.1 03/28/2012 04:33 AM       HCG (If Applicable):   Lab Results   Component Value Date    PREGTESTUR NEGATIVE 03/28/2012        ABGs:

## 2025-04-03 NOTE — OP NOTE
Operative Note      Patient: Chikis Green  YOB: 1975  MRN: [id number]    Date of Procedure: 4/3/2025    Pre-Op Diagnosis Codes:      * Malignant neoplasm of right breast [C50.911]    Post-Op Diagnosis: Same       Procedure(s):  RIGHT mastectomy - blue dye injection - right axillary sentinel lymph node biopsy blue dye / nuclear  /neoprobe / 2 set ups /2 plasma blades  left prophylactic mastectomy - closure right mastectomy defect    Surgeon(s):  Amandeep Elena MD Cash, Adam Daniel, MD    Assistant:   Resident: Nikky De Los Santos MD    Anesthesia: General    Estimated Blood Loss (mL): 200     Complications: None    Specimens:   ID Type Source Tests Collected by Time Destination   A : SENTINEL LYMPH NODE #1 HOT BLUE 4592 Tissue Tissue SURGICAL PATHOLOGY Amandeep Elena MD 4/3/2025 1456    B : SENTINEL LYMPH NODE #2 HOT BLUE 7892 Tissue Tissue SURGICAL PATHOLOGY Amandeep Elena MD 4/3/2025 1458    C : RIGHT BREAST SIMPLE MASTECTOMY Tissue Tissue SURGICAL PATHOLOGY Amandeep Elena MD 4/3/2025 1459    D : Left Breast - Short Superior / Long Lateral Tissue Tissue SURGICAL PATHOLOGY Amandeep Elena MD 4/3/2025 1530          Findings:  Infection Present At Time Of Surgery (PATOS) (choose all levels that have infection present):  No infection present  Kamrar Node Biopsy for Breast Cancer - Right  Operation performed with curative intent. Yes   Tracer(s) used to identify sentinel nodes in the upfront surgery (non-neoadjuvant) setting (select all that apply). Dye and Radioactive tracer   Tracer(s) used to identify sentinel nodes in the neoadjuvant setting (select all that apply). N/A   All nodes (colored or non-colored) present at the end of a dye-filled lymphatic channel were removed. Yes   All significantly radioactive nodes were removed. Yes   All palpably suspicious nodes were removed. Yes   Biopsy-proven positive nodes marked with clips prior to chemotherapy were identified and removed. N/A 
and all questions were answered.     OPERATIVE PROCEDURE:  She was taken back to the operating room, placed in the supine position.  SCDs were on and functioning at the time of induction of anesthesia.  Preoperative antibiotics were given prior to incision.  The area was prepped and draped in the usual sterile fashion with chlorhexidine prep stick following a wash with chlorhexidine Graham wipes.  Elliptical excisions were designed around the nipple and areolar complex.  Attention was  turned to the let mastectomy.  PEAK PlasmaBlade was used to incise full thickness through the skin with the aid of mastectomy hooks.  Edy's ligaments were identified and followed circumferentially around the breast down to the chest wall.  The breast was then lifted off of the underlying pectoralis major muscle which included the pectoralis fascia.  A 19-Guine[place] round Dewayne hubless drain was placed, sutured in place with a 2-0 Prolene. 20cc of 0.25% Marcaine was placed topically within the wound. The wound was closed with a 2-0 PDS deep and INSORB stables for deep dermal and 4-0 Monocryl Stratafix running subcuticular suture.    Once the breast surgeon was finished with the mastectomy and sentinel lymph node biopsy, per patient request I closed the wound in a layered fashion.  2-0 PDS was used to reapproximate the deep layers followed by 3-0 Monocryl deep dermal and 3-0 Monocryl strata fix running subcuticular suture.     Dermabond was applied followed by 1-inch Steri-Strips, Kerlix fluffs and a surgical bra..        Patient emerged from anesthesia without complication and taken to PACU in good condition.      Electronically signed by Morteza Rios MD on 4/3/2025 at 3:51 PM

## 2025-04-03 NOTE — INTERVAL H&P NOTE
Update History & Physical    The patient's History and Physical was reviewed with the patient and I examined the patient. There was no change. The surgical site was confirmed by the patient and me.     Plan: The risks, benefits, expected outcome, and alternative to the recommended procedure have been discussed with the patient. Patient understands and wants to proceed with the procedure.     Electronically signed by Amandeep Elena MD on 4/3/2025 at 12:25 PM

## 2025-04-04 ENCOUNTER — HOSPITAL ENCOUNTER (OUTPATIENT)
Dept: GENERAL RADIOLOGY | Age: 50
Setting detail: OBSERVATION
End: 2025-04-04
Attending: SURGERY
Payer: COMMERCIAL

## 2025-04-04 VITALS
WEIGHT: 125 LBS | TEMPERATURE: 97.5 F | HEART RATE: 65 BPM | RESPIRATION RATE: 18 BRPM | DIASTOLIC BLOOD PRESSURE: 54 MMHG | OXYGEN SATURATION: 96 % | BODY MASS INDEX: 21.34 KG/M2 | HEIGHT: 64 IN | SYSTOLIC BLOOD PRESSURE: 113 MMHG

## 2025-04-04 DIAGNOSIS — C50.911 INVASIVE DUCTAL CARCINOMA OF RIGHT BREAST: ICD-10-CM

## 2025-04-04 DIAGNOSIS — C50.911 INVASIVE DUCTAL CARCINOMA OF RIGHT BREAST: Primary | ICD-10-CM

## 2025-04-04 PROCEDURE — 6360000002 HC RX W HCPCS

## 2025-04-04 PROCEDURE — 96374 THER/PROPH/DIAG INJ IV PUSH: CPT

## 2025-04-04 PROCEDURE — G0378 HOSPITAL OBSERVATION PER HR: HCPCS

## 2025-04-04 PROCEDURE — 76098 X-RAY EXAM SURGICAL SPECIMEN: CPT

## 2025-04-04 PROCEDURE — 6370000000 HC RX 637 (ALT 250 FOR IP): Performed by: STUDENT IN AN ORGANIZED HEALTH CARE EDUCATION/TRAINING PROGRAM

## 2025-04-04 RX ORDER — ONDANSETRON 4 MG/1
4 TABLET, ORALLY DISINTEGRATING ORAL 3 TIMES DAILY PRN
Qty: 21 TABLET | Refills: 0 | Status: SHIPPED | OUTPATIENT
Start: 2025-04-04

## 2025-04-04 RX ORDER — OXYCODONE HYDROCHLORIDE 5 MG/1
5 TABLET ORAL EVERY 6 HOURS PRN
Qty: 20 TABLET | Refills: 0 | Status: SHIPPED | OUTPATIENT
Start: 2025-04-04 | End: 2025-04-09

## 2025-04-04 RX ORDER — PROCHLORPERAZINE EDISYLATE 5 MG/ML
10 INJECTION INTRAMUSCULAR; INTRAVENOUS EVERY 6 HOURS PRN
Status: DISCONTINUED | OUTPATIENT
Start: 2025-04-04 | End: 2025-04-04 | Stop reason: HOSPADM

## 2025-04-04 RX ADMIN — ACETAMINOPHEN 650 MG: 325 TABLET ORAL at 05:07

## 2025-04-04 RX ADMIN — POLYETHYLENE GLYCOL 3350 17 G: 17 POWDER, FOR SOLUTION ORAL at 07:55

## 2025-04-04 RX ADMIN — PROCHLORPERAZINE EDISYLATE 10 MG: 5 INJECTION INTRAMUSCULAR; INTRAVENOUS at 00:34

## 2025-04-04 RX ADMIN — CEPHALEXIN 500 MG: 500 CAPSULE ORAL at 05:08

## 2025-04-04 ASSESSMENT — PAIN DESCRIPTION - DESCRIPTORS: DESCRIPTORS: ACHING;TENDER;THROBBING;TIGHTNESS

## 2025-04-04 ASSESSMENT — PAIN SCALES - GENERAL
PAINLEVEL_OUTOF10: 0
PAINLEVEL_OUTOF10: 6

## 2025-04-04 ASSESSMENT — PAIN DESCRIPTION - ORIENTATION: ORIENTATION: RIGHT;LEFT

## 2025-04-04 ASSESSMENT — PAIN DESCRIPTION - LOCATION: LOCATION: CHEST

## 2025-04-04 ASSESSMENT — PAIN - FUNCTIONAL ASSESSMENT: PAIN_FUNCTIONAL_ASSESSMENT: ACTIVITIES ARE NOT PREVENTED

## 2025-04-04 NOTE — CARE COORDINATION
[facility] transition of care.  Patient admitted from PACU observation status.  S/p right simple mastectomy with sentinel lymph node biopsy and left prophylactic mastectomy 4/3/25.  Discharge order noted.  Patient was discharged prior to seeing.  Per nursing patient declined home care.  Her family took her home.  Khari Don RN [phone].

## 2025-04-04 NOTE — DISCHARGE SUMMARY
Physician Discharge Summary     Patient ID:  Chikis Green  [id number]  49 y.o.  1975    Admit date: 4/3/2025    Discharge date and time: 04/04/25 6:44 AM     Admitting Physician: Amandeep Elena MD     Admission Diagnoses: Malignant neoplasm of right breast [C50.911]  Malignant neoplasm of right female breast, unspecified estrogen receptor status, unspecified site of breast [C50.911]  Breast cancer in female [C50.919]    Discharge Diagnoses: Principal Problem:    Malignant neoplasm of right breast  Active Problems:    Malignant neoplasm of right female breast, unspecified estrogen receptor status, unspecified site of breast    Breast cancer in female    Pre-operative laboratory examination  Resolved Problems:    * No resolved hospital problems. *      Admission Condition: fair    Discharged Condition: stable      Hospital Course:  Chikis Green is a 49 y.o. female who presented with right breast cancer. She underwent right simple mastectomy with sentinel lymph node biopsy and left prophylactic mastectomy on 4/3/25. The patient's course was otherwise uneventful. She progressed well, pain was controlled on PO medications. She was tolerating a regular diet with no nausea or vomiting, and was in a suitable condition for discharge to home in stable condition.      Consults:   IP CONSULT TO HOME CARE NEEDS    Significant Diagnostic Studies:   No results found.    Discharge Exam:  General appearance: NAD  Head: NCAT, PERRL, EOMI, pink conjunctiva  Neck: supple, no masses  Lungs: symmetric chest rise, no audible wheezes  Breast: Bilateral incisions C/D/I with steri strips in place. Some tiny ecchymosis to right axilla. No underlying hematoma. Drains SS with 55 cc out of left and 125 cc out of right.  Heart: warm throughout  Abdomen: soft, non-distended, non-tender to palpation throughout  Skin: no visible lesions  Extremities: extremities normal, atraumatic, no cyanosis or edema    Disposition: home    In

## 2025-04-04 NOTE — PLAN OF CARE
Problem: Discharge Planning  Goal: Discharge to home or other facility with appropriate resources  4/4/2025 0942 by Sadie Nichole RN  Outcome: Progressing  4/3/2025 2204 by Julio Cesar Pérez RN  Outcome: Progressing     Problem: Safety - Adult  Goal: Free from fall injury  4/4/2025 0942 by Sadie Nichole RN  Outcome: Progressing  4/3/2025 2204 by Julio Cesar Pérez RN  Outcome: Progressing     Problem: Pain  Goal: Verbalizes/displays adequate comfort level or baseline comfort level  4/4/2025 0942 by Sadie Nichole RN  Outcome: Progressing  4/3/2025 2204 by Julio Cesar Pérez RN  Outcome: Progressing     Problem: ABCDS Injury Assessment  Goal: Absence of physical injury  Outcome: Progressing

## 2025-04-04 NOTE — PROGRESS NOTES
General Surgery   Daily Progress Note      Patient's Name/Date of Birth: Chikis Green / 1975    Date: April 4, 2025     Chief Complaint: breast cancer    Patient Active Problem List   Diagnosis    Chronic headaches    Migraine    At high risk for breast cancer    Family history of breast cancer in first degree relative    Invasive ductal carcinoma of right breast    Malignant neoplasm of right breast    Malignant neoplasm of right female breast, unspecified estrogen receptor status, unspecified site of breast    Breast cancer in female    Pre-operative laboratory examination       Subjective: Patient feels well this AM. Pain reasonably controlled with pain medication. Had some emesis when she took Roxicodone on an empty stomach, but she has ate without nausea/vomiting since.     Objective:  /78   Pulse 71   Temp 97.8 °F (36.6 °C) (Temporal)   Resp 18   Ht 1.626 m (5' 4.02\")   Wt 56.7 kg (125 lb)   LMP 03/10/2025   SpO2 97%   BMI 21.45 kg/m²   Labs:  No results for input(s): \"WBC\", \"HGB\", \"HCT\" in the last 72 hours.    Invalid input(s): \"PLR\"  No results for input(s): \"NA\", \"K\", \"CL\", \"CO2\", \"BUN\", \"CREATININE\" in the last 72 hours.  No results for input(s): \"ALKPHOS\", \"ALT\", \"AST\", \"BILITOT\", \"BILIDIR\", \"LABALBU\", \"LIPASE\" in the last 72 hours.      General appearance: NAD  Head: NCAT, PERRL, EOMI, pink conjunctiva  Neck: supple, no masses  Lungs: symmetric chest rise, no audible wheezes  Breast: Bilateral incisions C/D/I with steri strips in place. Some tiny ecchymosis to right axilla. No underlying hematoma. Drains SS with 55 cc out of left and 125 cc out of right.  Heart: warm throughout  Abdomen: soft, non-distended, non-tender to palpation throughout  Skin: no visible lesions  Extremities: extremities normal, atraumatic, no cyanosis or edema      Assessment/Plan:  Chikis Green is a 49 y.o. female with right invasive ductal carcinoma s/p right simple mastectomy with sentinel lymph node  biopsy and left prophylactic mastectomy 4/3/25.    Diet as tolerated.  Pain/nausea control prn.  Maintain drains.  Encourage OOB and ambulation.  DVT ppx: Lovenox and SCDs  DC this AM. F/u with Dr. Rios and Dr. Elena.    Nikky De Los Santos MD  PGY-4 General Surgery Resident

## 2025-04-04 NOTE — PROGRESS NOTES
,CLINICAL PHARMACY NOTE: MEDS TO BEDS    Total # of Prescriptions Filled: 3   The following medications were delivered to the patient:  Cephalexin 500  Ondansetron 4  Oxycodone 5    Additional Documentation:   picked up in pharmacy

## 2025-04-04 NOTE — PROGRESS NOTES
S:  -Patient says incisional pain is worse on the right side compared to the left, but overall pain controlled  -No nausea or vomiting  -Denies dramatic increase of swelling or pain at surgical sites    O:  /78   Pulse 71   Temp 97.8 °F (36.6 °C) (Temporal)   Resp 18   Ht 1.626 m (5' 4.02\")   Wt 56.7 kg (125 lb)   LMP 03/10/2025   SpO2 97%   BMI 21.45 kg/m²       GENERAL: No acute distress.   CHEST: Bilateral mastectomy incisions clean dry and intact.  No swelling or hematoma.  No ecchymosis.  Right sided ZACHERY drain serosanguineous output.    A/P:  S/p R total mastectomy with sentinel lymph node biopsy and left prophylactic mastectomy    -Continue current care  -Please page for significant increase in pain, new onset swelling, or rachel bloody output from drain    Electronically signed by Pablo Ying DO on 3/30/2025 at 6:59 PM

## 2025-04-04 NOTE — PROGRESS NOTES
Perfect served for lab draw. Pt declined. States dr's were supposed to cancel labs 6:30 am. Notified rnandre. Pt is discharging today.

## 2025-04-07 ENCOUNTER — CARE COORDINATION (OUTPATIENT)
Dept: OTHER | Facility: CLINIC | Age: 50
End: 2025-04-07

## 2025-04-07 RX ORDER — IBUPROFEN 200 MG
200 TABLET ORAL EVERY 6 HOURS PRN
COMMUNITY

## 2025-04-07 RX ORDER — ACETAMINOPHEN 325 MG/1
650 TABLET ORAL EVERY 6 HOURS PRN
COMMUNITY

## 2025-04-07 RX ORDER — POLYETHYLENE GLYCOL 3350 17 G/17G
17 POWDER, FOR SOLUTION ORAL DAILY PRN
COMMUNITY

## 2025-04-07 NOTE — CARE COORDINATION
resumed Vitamins or Probiotic post discharge.  Patient encouraged to resume Probiotic due to taking antibiotic.  Patient verbalized understanding.     Patient reports last dose of Zofran midnight last night; was taking every 8 hours.  Patient denies nausea and vomiting.  Patient c/o continued dizziness.  Patient reports hydrating with water and Gatorade.  Patient reports eating.  Patient denies falls; instructed to change positions slowly.  Patient verbalized understanding.  Patient reports spot on left tongue that is numb; patient notified staff during admission.  Patient denies cut to tongue.      Patient instructed may shower 48 hours post op but do not submerge in water.  Patient reports has taken a sponge bath and has showered facing away from handheld showerhead with spouse's assistance.  Patient applying new dressing around queenie drains and to surgical sites daily.  Patient reports received education regarding emptying queenie drains and milking tubing.  Patient reports yesterday left drain 20 ml and right 35 ml of sanguinous drainage.  As of today, left 10 ml and right 12 ml of serosanguinous drainage.  Patient aware of need to notify provider when less than 50 cc daily in queenie drains.  Patient has appt on 4/9/25 with Dr Rios and will wait for appt.  Patient denies fever, chills, drainage from incisions.  Patient instructed on s/s of infection to report including but not limited to fever, chills, foul smelling drainage, increased warmth, redness or hardness around incision.   Patient notified if s/s of infection are noted, do not delay notifying provider.  Patient verbalized understanding.  Patient instructed on high protein diet, Vitamin C and hydration for incision healing.  Patient instructed to snip ends of steri strips if begin to roll up but do not remove steri strips.  Patient verbalized understanding.     Patient reports taking Glycolax 17 grams daily.  Patient reports bowel movement today.  Discussed effects

## 2025-04-09 ENCOUNTER — OFFICE VISIT (OUTPATIENT)
Dept: SURGERY | Age: 50
End: 2025-04-09

## 2025-04-09 VITALS
OXYGEN SATURATION: 100 % | DIASTOLIC BLOOD PRESSURE: 64 MMHG | SYSTOLIC BLOOD PRESSURE: 108 MMHG | RESPIRATION RATE: 18 BRPM | TEMPERATURE: 97.4 F | HEART RATE: 62 BPM

## 2025-04-09 DIAGNOSIS — Z85.3 HISTORY OF RIGHT BREAST CANCER: Primary | ICD-10-CM

## 2025-04-09 PROCEDURE — 99024 POSTOP FOLLOW-UP VISIT: CPT | Performed by: PLASTIC SURGERY

## 2025-04-10 ENCOUNTER — OFFICE VISIT (OUTPATIENT)
Dept: SURGERY | Age: 50
End: 2025-04-10

## 2025-04-10 VITALS
DIASTOLIC BLOOD PRESSURE: 70 MMHG | TEMPERATURE: 97 F | SYSTOLIC BLOOD PRESSURE: 104 MMHG | HEART RATE: 65 BPM | OXYGEN SATURATION: 98 %

## 2025-04-10 DIAGNOSIS — Z85.3 HISTORY OF RIGHT BREAST CANCER: Primary | ICD-10-CM

## 2025-04-10 LAB — SURGICAL PATHOLOGY REPORT: NORMAL

## 2025-04-10 PROCEDURE — 99024 POSTOP FOLLOW-UP VISIT: CPT | Performed by: PLASTIC SURGERY

## 2025-04-10 NOTE — PROGRESS NOTES
Subjective:    Follow up today from bilateral mastectomy. Denies fever, nausea, vomiting, leg pain or swelling, pain is minimal.    Objective:    /64 (BP Site: Left Upper Arm, Patient Position: Sitting, BP Cuff Size: Medium Adult)   Pulse 62   Temp 97.4 °F (36.3 °C) (Infrared)   Resp 18   LMP 03/10/2025   SpO2 100%       Wound: Clean, dry, and intact, no drainage.  ZACHERY drains are serous and less than 20 cc per 24 hours.  No evidence of infection or fluid collection.      Assessment:    Patient Active Problem List   Diagnosis    Chronic headaches    Migraine    At high risk for breast cancer    Family history of breast cancer in first degree relative    Invasive ductal carcinoma of right breast    Malignant neoplasm of right breast    Malignant neoplasm of right female breast, unspecified estrogen receptor status, unspecified site of breast    Breast cancer in female    Pre-operative laboratory examination       Plan:     ZACHERY drains removed.  Steri-Strips to remain intact.  Continue surgical bra and compression.    F/U 1 week    Call office with concerns or signs of infection.        This document is generated, in part, by voice recognition software and thus  syntax and grammatical errors are possible.    Morteza Rios MD  8:34 AM  4/10/2025

## 2025-04-10 NOTE — PROGRESS NOTES
Subjective:    Follow up today from bilateral simple mastectomy. Denies fever, nausea, vomiting, leg pain or swelling, pain is absent.  Patient has concerns of continuing drainage from the drain site.  She also noticed some thing poking out from the drain hole.    Objective:    LMP 03/10/2025       Wound: Clean, dry, and intact, no drainage.  Steri-Strips intact.    There are some fibrinous exudate surrounding the drain on the right side.  And a small linear fibrinous projection that is indicative of the drain channel.  This was gently pulled.  Serous fluid drips are noted at both sides.  No purulence.      Assessment:    Patient Active Problem List   Diagnosis    Chronic headaches    Migraine    At high risk for breast cancer    Family history of breast cancer in first degree relative    Invasive ductal carcinoma of right breast    Malignant neoplasm of right breast    Malignant neoplasm of right female breast, unspecified estrogen receptor status, unspecified site of breast    Breast cancer in female    Pre-operative laboratory examination       Plan:     Reassured patient that these are nothing to be concerned about.  Continuing drainage over the course of several days is not unusual.  Continue to pad the area and monitor for signs of redness or purulence.  Patient maintain her follow-up visit next week.      Call office with concerns or signs of infection.          This document is generated, in part, by voice recognition software and thus  syntax and grammatical errors are possible.    Morteza Rios MD  9:38 AM  4/10/2025

## 2025-04-11 ENCOUNTER — RESULTS FOLLOW-UP (OUTPATIENT)
Dept: BREAST CENTER | Age: 50
End: 2025-04-11

## 2025-04-11 ENCOUNTER — TELEPHONE (OUTPATIENT)
Dept: BREAST CENTER | Age: 50
End: 2025-04-11

## 2025-04-11 NOTE — TELEPHONE ENCOUNTER
JOSSIE Soler submitted Oncotype RX and scanned RX and confirmation was received under media tab in chart. Once results are finalized will scan into patient chart.     Electronically signed by Meredith Soler RN on 4/11/25 at 4:24 PM EDT

## 2025-04-14 ENCOUNTER — CARE COORDINATION (OUTPATIENT)
Dept: OTHER | Facility: CLINIC | Age: 50
End: 2025-04-14

## 2025-04-21 ENCOUNTER — OFFICE VISIT (OUTPATIENT)
Dept: SURGERY | Age: 50
End: 2025-04-21

## 2025-04-21 VITALS
HEART RATE: 76 BPM | DIASTOLIC BLOOD PRESSURE: 62 MMHG | OXYGEN SATURATION: 98 % | SYSTOLIC BLOOD PRESSURE: 96 MMHG | TEMPERATURE: 97.3 F | RESPIRATION RATE: 18 BRPM

## 2025-04-21 DIAGNOSIS — Z85.3 HISTORY OF RIGHT BREAST CANCER: Primary | ICD-10-CM

## 2025-04-21 PROCEDURE — 99024 POSTOP FOLLOW-UP VISIT: CPT | Performed by: PHYSICIAN ASSISTANT

## 2025-04-21 RX ORDER — CYCLOBENZAPRINE HCL 10 MG
10 TABLET ORAL 3 TIMES DAILY PRN
Qty: 15 TABLET | Refills: 0 | Status: SHIPPED | OUTPATIENT
Start: 2025-04-21

## 2025-04-21 NOTE — PROGRESS NOTES
Subjective:    Follow up today from bilateral simple mastectomy. Denies fever, nausea, vomiting, leg pain or swelling, pain is moderate to bilateral lateral mastectomy scar lines.  Patient states that she has been backing off her Tylenol and ibuprofen but still having complaints of pain she is no longer taking narcotic medication she has been keeping her activities of daily living limited    Objective:    BP 96/62 (BP Site: Left Upper Arm, Patient Position: Sitting, BP Cuff Size: Medium Adult)   Pulse 76   Temp 97.3 °F (36.3 °C) (Infrared)   Resp 18   SpO2 98%       Bilateral mastectomy scar lines well-healed no concern for fluid collections or seromas on palpation no signs of infection      Assessment:    Patient Active Problem List   Diagnosis    Chronic headaches    Migraine    At high risk for breast cancer    Family history of breast cancer in first degree relative    Invasive ductal carcinoma of right breast    Malignant neoplasm of right breast    Malignant neoplasm of right female breast, unspecified estrogen receptor status, unspecified site of breast    Breast cancer in female    Pre-operative laboratory examination       Plan:     Status post bilateral mastectomy.    Informed the patient that she no longer needs to wear her compression bra as she has had her ZACHERY drains removed without concern of seroma.    I educated the patient she is not that far out from surgery and pain and tenderness may be continued.  I discussed with her today starting and a trial of Flexeril to see if this helps with any discomfort.    I discussed the patient that she is well-healed she can continue to follow with Dr. Elena and breast surgery and begin scar massage in the next few weeks.  She can also begin some warm compresses to her bilateral chest wall.  She does not need to follow-up with her office moving forward however should she need anything or have any concerns we are always happy to see her    Should the patient

## 2025-04-22 ENCOUNTER — CARE COORDINATION (OUTPATIENT)
Dept: OTHER | Facility: CLINIC | Age: 50
End: 2025-04-22

## 2025-04-22 NOTE — CARE COORDINATION
Care Transitions Note    Follow Up Call     Patient Current Location:  [place]    Care Transition Nurse contacted the patient by telephone. Verified name and  as identifiers.    Additional needs identified to be addressed with provider   No needs identified                 Method of communication with provider: none.    Care Summary Note: Patient reports a little bit of nerve pain from sternum across right side.  Patient completed appt with Dr. Rios yesterday who prescribed Flexeril 10 mg po tid prn.  Patient instructed to refrain from driving when taking muscle relaxer.  Patient voicing concerns regarding taking Flexeril due to side effect of tiredness.  Patient continues to take Tylenol intermittently for pain control.  Patient reports Dr. Rios removed steri strips.  Patient reports surgical glue intact in places.  Patient reminded to refrain from picking at surgical glue, wash incisions gently, pat incisions thoroughly dry.  Patient instructed to refrain from applying lotions, creams, ointments, or hydrogen peroxide unless directed by physician.  Patient reports incisions look really good.  Patient denies s/s of infection.  Patient instructed on s/s of infection to report including but not limited to fever, chills, foul smelling drainage, increased warmth, redness or hardness around incision.  Patient instructed on high protein diet, Vitamin C and hydration for incision healing.  Patient reports appetite is at baseline and patient is hydrating with Gatorade.  Patient reports Dr. Rios awaiting Oncotyping results.  Patient reports a little anxiety, frustration; states tired of waiting.  Patient reports able to quit using compression.  Patient is sitting on porch at this time.  Patient continues to report good family support from family.      Patient denies any further needs, questions, or concerns at this time other than those being addressed.  Patient is agreeable to future follow up calls.     Plan of care

## 2025-04-25 ENCOUNTER — OFFICE VISIT (OUTPATIENT)
Dept: FAMILY MEDICINE CLINIC | Age: 50
End: 2025-04-25
Payer: COMMERCIAL

## 2025-04-25 ENCOUNTER — TELEPHONE (OUTPATIENT)
Dept: BREAST CENTER | Age: 50
End: 2025-04-25

## 2025-04-25 VITALS
RESPIRATION RATE: 20 BRPM | BODY MASS INDEX: 21.68 KG/M2 | TEMPERATURE: 98.7 F | DIASTOLIC BLOOD PRESSURE: 74 MMHG | HEART RATE: 63 BPM | HEIGHT: 64 IN | OXYGEN SATURATION: 99 % | SYSTOLIC BLOOD PRESSURE: 116 MMHG | WEIGHT: 127 LBS

## 2025-04-25 DIAGNOSIS — J01.90 ACUTE SINUSITIS, RECURRENCE NOT SPECIFIED, UNSPECIFIED LOCATION: Primary | ICD-10-CM

## 2025-04-25 DIAGNOSIS — R05.1 ACUTE COUGH: ICD-10-CM

## 2025-04-25 DIAGNOSIS — J34.89 SINUS DRAINAGE: ICD-10-CM

## 2025-04-25 LAB — SURGICAL PATHOLOGY REPORT: NORMAL

## 2025-04-25 PROCEDURE — 99213 OFFICE O/P EST LOW 20 MIN: CPT | Performed by: INTERNAL MEDICINE

## 2025-04-25 RX ORDER — BENZONATATE 100 MG/1
100 CAPSULE ORAL 3 TIMES DAILY PRN
Qty: 30 CAPSULE | Refills: 0 | Status: SHIPPED | OUTPATIENT
Start: 2025-04-25 | End: 2025-05-05

## 2025-04-25 RX ORDER — PREDNISONE 10 MG/1
TABLET ORAL
Qty: 12 TABLET | Refills: 0 | Status: SHIPPED | OUTPATIENT
Start: 2025-04-25 | End: 2025-05-01

## 2025-04-25 NOTE — TELEPHONE ENCOUNTER
[facility] Oncotype Dx Breast Recurrence Score Report: 15     Scanned to patient's chart and routed results to Dr. Elena(breast surgeon).    Electronically signed by Meredith Soler RN on 4/25/25 at 7:18 AM EDT

## 2025-04-26 ASSESSMENT — ENCOUNTER SYMPTOMS
COUGH: 1
VOMITING: 0
CHEST TIGHTNESS: 0
SORE THROAT: 1
EYES NEGATIVE: 1
SHORTNESS OF BREATH: 0
DIARRHEA: 0
SINUS PRESSURE: 1
WHEEZING: 0
ABDOMINAL PAIN: 0
NAUSEA: 0

## 2025-04-26 NOTE — PROGRESS NOTES
[facility]     25  Chikis Green : 1975 Sex: female  Age: 49 y.o.    Chief Complaint   Patient presents with    Head Congestion     Onset x 4 days     Cough    Drainage       HPI    Patient presents to express care today complaining of head congestion, cough, sinus drainage, sore throat, headache over the past 5 to 6 days.  Denies fever or chills.  Denies shortness of breath with the cough.  She does have green coloration to the mucus she is getting out.  She does state that she is about 3 weeks out from bilateral mastectomy and had been on Keflex with that finishing it up about 2 weeks ago.        Review of Systems   Constitutional:  Negative for chills and fever.   HENT:  Positive for congestion, postnasal drip, sinus pressure and sore throat. Negative for ear pain.    Eyes: Negative.    Respiratory:  Positive for cough. Negative for chest tightness, shortness of breath and wheezing.    Cardiovascular:  Negative for chest pain.   Gastrointestinal:  Negative for abdominal pain, diarrhea, nausea and vomiting.   Musculoskeletal:  Negative for myalgias.   Neurological:  Positive for headaches.               REST OF PERTINENT ROS GONE OVER AND WAS NEGATIVE.                 Current Outpatient Medications:     benzonatate (TESSALON) 100 MG capsule, Take 1 capsule by mouth 3 times daily as needed for Cough, Disp: 30 capsule, Rfl: 0    amoxicillin-clavulanate (AUGMENTIN) 875-125 MG per tablet, Take 1 tablet by mouth 2 times daily for 7 days, Disp: 14 tablet, Rfl: 0    predniSONE (DELTASONE) 10 MG tablet, Take 3 tablets by mouth daily for 2 days, THEN 2 tablets daily for 2 days, THEN 1 tablet daily for 2 days., Disp: 12 tablet, Rfl: 0    cyclobenzaprine (FLEXERIL) 10 MG tablet, Take 1 tablet by mouth 3 times daily as needed for Muscle spasms, Disp: 15 tablet, Rfl: 0    Cholecalciferol (VITAMIN D) 50 MCG (2000 UT) CAPS capsule, Take 1 capsule by mouth daily, Disp: , Rfl:     b complex vitamins

## 2025-04-28 ENCOUNTER — OFFICE VISIT (OUTPATIENT)
Dept: BREAST CENTER | Age: 50
End: 2025-04-28

## 2025-04-28 VITALS
HEIGHT: 64 IN | HEART RATE: 74 BPM | RESPIRATION RATE: 16 BRPM | SYSTOLIC BLOOD PRESSURE: 106 MMHG | OXYGEN SATURATION: 100 % | BODY MASS INDEX: 21.8 KG/M2 | DIASTOLIC BLOOD PRESSURE: 72 MMHG

## 2025-04-28 DIAGNOSIS — C50.911 INVASIVE DUCTAL CARCINOMA OF RIGHT BREAST (HCC): Primary | ICD-10-CM

## 2025-04-28 PROCEDURE — 99024 POSTOP FOLLOW-UP VISIT: CPT | Performed by: SURGERY

## 2025-04-28 NOTE — PROGRESS NOTES
Date of visit: 4/28/2025 07/24/23: NG  08/06/24 03/11/25: New Cancer Diagnosis  04/28/25: Post op    DIAGNOSIS:  1. (08/06/24) Higher risk for breast cancer  VANDANA 30% (17% per radiology)  2. Family history of breast cancer  * Mother/63  * Maternal aunt/39  3. Mother genetic testing negative  4. History of breast biopsy  5. History of abnormal SBE  * RIGHT IMF  6. (02/19/25) RIGHT (1:00-3) invasive ductal/lobular carcinoma  Grade 1  Clinical stage: [id number]  ER (95) MA (75) HER 2 (0)  Pathologic stage: T1b(10mm)N0Mx    TREATMENT  1. (04/03/25) BILATERAL mastectomies and RIGHT SLNB    IMAGING/PROCEDURES:  Patient deferring MRI re gadolinium  1. (07/24/23) WBUS: BIRADS-2  * scattered cysts  2. (01/19/24) BILATERAL st-mammogram: BIRADS-1  3. (07/30/24) WBUS: BIRADS-2  4. (02/05/25) BILATERAL st-mammogram: BIRADS-0  * RIGHT architectural distortion  5. (02/10/25) RIGHT dt-mammogram and US: BIRADS-4  * RIGHT persistent architectural distortion  * RIGHT (1:00-3) 4mm mass  6. (02/19/25) RIGHT US biopsy    Breast History  Chikis Green was in the office for a postoperative visit.    Chikis recently underwent an uneventful bilateral mastectomy and sentinel lymph node biopsy for an early stage and biologically favorable right breast cancer.    Breast Symptoms  Chikis is experiencing some significant discomfort.  This is on both chest walls.  In addition however she has a burning type pain from her right axilla around the latissimus muscle.    Breast Examination  The incisions are healing nicely with no evidence of infection hematoma or seroma.    Pathology  I discussed the surgical pathology report with Chikis.  The tumor was small and resected with negative margins.  There is no evidence of nishant disease.  Probably most importantly the genomic test showed a low risk tumor with no benefit to chemotherapy.    Assessment/Plan  Chikis Green has completed the surgical management of early stage and biologically favorable

## 2025-04-28 NOTE — PATIENT INSTRUCTIONS
Referral will be submitted to Dr Alcantara -- his office will contact patient    Referral to PT will be submitted

## 2025-04-30 ENCOUNTER — TELEPHONE (OUTPATIENT)
Dept: BREAST CENTER | Age: 50
End: 2025-04-30

## 2025-04-30 NOTE — TELEPHONE ENCOUNTER
Medical Oncology referral has been submitted to Dr Alcantara's office   - [facility] - [place], [place] -- patient information has been faxed -- their office will contact patient with appointment

## 2025-05-01 ENCOUNTER — TELEPHONE (OUTPATIENT)
Dept: BREAST CENTER | Age: 50
End: 2025-05-01

## 2025-05-01 DIAGNOSIS — C50.911 INVASIVE DUCTAL CARCINOMA OF RIGHT BREAST (HCC): Primary | ICD-10-CM

## 2025-05-01 NOTE — TELEPHONE ENCOUNTER
Referral has been submitted to Occupational Therapy - [facility] - Patient information has been faxed [phone]. The facility will contact patient with an appointment.

## 2025-05-02 ENCOUNTER — CARE COORDINATION (OUTPATIENT)
Dept: OTHER | Facility: CLINIC | Age: 50
End: 2025-05-02

## 2025-05-02 NOTE — CARE COORDINATION
Care Transitions Note    Follow Up Call     Attempted to reach patient for transitions of care follow up.  Unable to reach patient.      Outreach Attempts:   HIPAA compliant voicemail left for patient.     Follow Up Appointment:   Future Appointments         Provider Specialty Dept Phone    5/4/2026 3:00 PM Amandeep Elena MD [facility] [phone]            Plan for follow-up call in 6-10 days based on severity of symptoms and risk factors. Plan for next call:   symptom management-pain, s/s infection  follow-up appointment-completed appt with Dr. Elena  medication management-med changes; pain control  Results       Dorina Hardwick RN

## 2025-05-02 NOTE — TELEPHONE ENCOUNTER
Referral to [facility] has been submitted for Physical Therapy -- The original referral was for Occupational Therapy and their facility stated that they do not do OT for the breast .  Dr Elena spoke with patient and new referral has been submitted and faxed.

## 2025-05-03 PROBLEM — Z01.812 PRE-OPERATIVE LABORATORY EXAMINATION: Status: RESOLVED | Noted: 2025-04-03 | Resolved: 2025-05-03

## 2025-05-09 ENCOUNTER — CARE COORDINATION (OUTPATIENT)
Dept: OTHER | Facility: CLINIC | Age: 50
End: 2025-05-09

## 2025-05-09 NOTE — CARE COORDINATION
Care Transitions Note    Final Call     Attempted to reach patient for transitions of care follow up.  Unable to reach patient.      Outreach Attempts:   Multiple attempts to contact patient at phone numbers on file.   HIPAA compliant voicemail left for patient.     Patient closed (unable to reach patient) from the Care Transitions program on 5/9/25.    This ACM to sign off and close program.     Upcoming Appointments:    Future Appointments         Provider Specialty Dept Phone    5/4/2026 3:00 PM Amandeep Elena MD [facility] [phone]            Dorina Hardwick RN

## 2025-05-13 DIAGNOSIS — C50.911 INVASIVE DUCTAL CARCINOMA OF RIGHT BREAST (HCC): Primary | ICD-10-CM

## 2025-05-13 RX ORDER — TRAMADOL HYDROCHLORIDE 50 MG/1
50 TABLET ORAL EVERY 4 HOURS PRN
Qty: 30 TABLET | Refills: 0 | Status: SHIPPED | OUTPATIENT
Start: 2025-05-13 | End: 2025-05-18

## (undated) DEVICE — TOWEL,OR,DSP,ST,BLUE,STD,6/PK,12PK/CS: Brand: MEDLINE

## (undated) DEVICE — 3M™ STERI-STRIP™ REINFORCED ADHESIVE SKIN CLOSURES, R1548, 1 IN X 5 IN (25 MM X 125 MM), 4 STRIPS/ENVELOPE: Brand: 3M™ STERI-STRIP™

## (undated) DEVICE — BANDAGE,GAUZE,4.5"X4.1YD,STERILE,LF: Brand: MEDLINE

## (undated) DEVICE — STANDARD HYPODERMIC NEEDLE,ALUMINUM HUB: Brand: MONOJECT

## (undated) DEVICE — SPONGE LAP W18XL18IN WHT COT 4 PLY FLD STRUNG RADPQ DISP ST 2 PER PACK

## (undated) DEVICE — AGENT HEMSTAT 5GM ARISTA AH

## (undated) DEVICE — LIQUIBAND RAPID ADHESIVE 36/CS 0.8ML: Brand: MEDLINE

## (undated) DEVICE — DRAPE THER FLUID WARMING 66X44 IN FLAT SLUSH DBL DISC ORS

## (undated) DEVICE — SYRINGE MED 10ML LUERLOCK TIP W/O SFTY DISP

## (undated) DEVICE — STERILE POLYISOPRENE POWDER-FREE SURGICAL GLOVES: Brand: PROTEXIS

## (undated) DEVICE — TUBING, SUCTION, 3/16" X 12', STRAIGHT: Brand: MEDLINE

## (undated) DEVICE — SYRINGE IRRIG 60ML SFT PLIABLE BLB EZ TO GRP 1 HND USE W/

## (undated) DEVICE — DECANTER BAG 9": Brand: MEDLINE INDUSTRIES, INC.

## (undated) DEVICE — SYRINGE MED 50ML LUERLOCK TIP

## (undated) DEVICE — APPLICATOR MEDICATED 26 CC SOLUTION HI LT ORNG CHLORAPREP

## (undated) DEVICE — PROBE GAMMA TRUNODE

## (undated) DEVICE — COVER PRB W14XL147CM TELESCOPICALLY FLD EXT LEN CIV-FLEX

## (undated) DEVICE — ELECTRODE ELECSURG BLADE 7 CM 2.75 IN MOD OPN E-Z CLN

## (undated) DEVICE — STERILE SAFETY PINS #2 2/PK: Brand: MEDLINE

## (undated) DEVICE — PLASMABLADE PS210-030S 3.0S LOCK: Brand: PLASMABLADE™

## (undated) DEVICE — UNIVERSAL DRAPE: Brand: MEDLINE INDUSTRIES, INC.

## (undated) DEVICE — GLOVE ORANGE PI 8   MSG9080

## (undated) DEVICE — Y-TYPE TUR/BLADDER IRRIGATION SET, REGULATING CLAMP

## (undated) DEVICE — ELECTRODE PT RET AD L9FT HI MOIST COND ADH HYDRGEL CORDED

## (undated) DEVICE — DOUBLE BASIN SET: Brand: MEDLINE INDUSTRIES, INC.

## (undated) DEVICE — GOWN,SIRUS,FABRNF,L,20/CS: Brand: MEDLINE

## (undated) DEVICE — CYSTO/BLADDER IRRIGATION SET, REGULATING CLAMP

## (undated) DEVICE — BLADE,STAINLESS-STEEL,15,STRL,DISPOSABLE: Brand: MEDLINE

## (undated) DEVICE — COVER,TABLE,60X90,STERILE: Brand: MEDLINE

## (undated) DEVICE — SYRINGE MED 10ML TRNSLUC BRL PLUNG BLK MRK POLYPR CTRL

## (undated) DEVICE — DRAPE,REIN 53X77,STERILE: Brand: MEDLINE